# Patient Record
Sex: MALE | Race: WHITE | Employment: OTHER | URBAN - METROPOLITAN AREA
[De-identification: names, ages, dates, MRNs, and addresses within clinical notes are randomized per-mention and may not be internally consistent; named-entity substitution may affect disease eponyms.]

---

## 2017-07-21 PROBLEM — E03.9 ACQUIRED HYPOTHYROIDISM: Status: ACTIVE | Noted: 2017-07-21

## 2017-07-21 PROBLEM — C61 PROSTATE CANCER (HCC): Status: ACTIVE | Noted: 2017-07-21

## 2018-04-19 RX ORDER — LISINOPRIL 40 MG/1
40 TABLET ORAL EVERY EVENING
Qty: 90 TABLET | Refills: 1 | Status: SHIPPED | OUTPATIENT
Start: 2018-04-19 | End: 2018-10-29 | Stop reason: SDUPTHER

## 2018-04-19 RX ORDER — AMLODIPINE BESYLATE 5 MG/1
5 TABLET ORAL DAILY
Qty: 90 TABLET | Refills: 1 | Status: SHIPPED | OUTPATIENT
Start: 2018-04-19 | End: 2018-10-29 | Stop reason: SDUPTHER

## 2018-06-19 ENCOUNTER — HOSPITAL ENCOUNTER (OUTPATIENT)
Age: 83
Discharge: HOME | End: 2018-06-21
Payer: MEDICARE

## 2018-06-19 ENCOUNTER — OFFICE VISIT (OUTPATIENT)
Dept: PRIMARY CARE CLINIC | Age: 83
End: 2018-06-19
Payer: MEDICARE

## 2018-06-19 VITALS
HEART RATE: 75 BPM | BODY MASS INDEX: 23.26 KG/M2 | OXYGEN SATURATION: 98 % | TEMPERATURE: 98.2 F | SYSTOLIC BLOOD PRESSURE: 138 MMHG | WEIGHT: 153 LBS | DIASTOLIC BLOOD PRESSURE: 74 MMHG

## 2018-06-19 DIAGNOSIS — E11.65 UNCONTROLLED TYPE 2 DIABETES MELLITUS WITH HYPERGLYCEMIA, UNSPECIFIED LONG TERM INSULIN USE STATUS: Chronic | ICD-10-CM

## 2018-06-19 DIAGNOSIS — E11.65 UNCONTROLLED TYPE 2 DIABETES MELLITUS WITH HYPERGLYCEMIA, UNSPECIFIED LONG TERM INSULIN USE STATUS: Primary | Chronic | ICD-10-CM

## 2018-06-19 DIAGNOSIS — I10 ESSENTIAL HYPERTENSION: ICD-10-CM

## 2018-06-19 LAB
ALBUMIN SERPL-MCNC: 4.3 G/DL (ref 3.5–5.2)
ALP BLD-CCNC: 67 U/L (ref 40–129)
ALT SERPL-CCNC: 10 U/L (ref 0–40)
ANION GAP SERPL CALCULATED.3IONS-SCNC: 19 MMOL/L (ref 7–16)
AST SERPL-CCNC: 15 U/L (ref 0–39)
BILIRUB SERPL-MCNC: 0.5 MG/DL (ref 0–1.2)
BUN BLDV-MCNC: 29 MG/DL (ref 8–23)
CALCIUM SERPL-MCNC: 9.7 MG/DL (ref 8.6–10.2)
CHLORIDE BLD-SCNC: 98 MMOL/L (ref 98–107)
CHOLESTEROL, TOTAL: 185 MG/DL (ref 0–199)
CO2: 21 MMOL/L (ref 22–29)
CREAT SERPL-MCNC: 1.4 MG/DL (ref 0.7–1.2)
CREATININE URINE: 0 MG/DL (ref 40–278)
GFR AFRICAN AMERICAN: 57
GFR NON-AFRICAN AMERICAN: 48 ML/MIN/1.73
GLUCOSE BLD-MCNC: 314 MG/DL (ref 74–109)
HBA1C MFR BLD: 11 % (ref 4.8–5.9)
HDLC SERPL-MCNC: 44 MG/DL
LDL CHOLESTEROL CALCULATED: 109 MG/DL (ref 0–99)
MICROALBUMIN UR-MCNC: <12 MG/L
MICROALBUMIN/CREAT UR-RTO: ABNORMAL (ref 0–30)
POTASSIUM SERPL-SCNC: 4.7 MMOL/L (ref 3.5–5)
SODIUM BLD-SCNC: 138 MMOL/L (ref 132–146)
TOTAL PROTEIN: 7.5 G/DL (ref 6.4–8.3)
TRIGL SERPL-MCNC: 161 MG/DL (ref 0–149)
VLDLC SERPL CALC-MCNC: 32 MG/DL

## 2018-06-19 PROCEDURE — 83036 HEMOGLOBIN GLYCOSYLATED A1C: CPT

## 2018-06-19 PROCEDURE — 80061 LIPID PANEL: CPT

## 2018-06-19 PROCEDURE — 99213 OFFICE O/P EST LOW 20 MIN: CPT | Performed by: FAMILY MEDICINE

## 2018-06-19 PROCEDURE — 82570 ASSAY OF URINE CREATININE: CPT

## 2018-06-19 PROCEDURE — 80053 COMPREHEN METABOLIC PANEL: CPT

## 2018-06-19 PROCEDURE — 82044 UR ALBUMIN SEMIQUANTITATIVE: CPT

## 2018-06-19 ASSESSMENT — ENCOUNTER SYMPTOMS
SHORTNESS OF BREATH: 0
BLURRED VISION: 0
BLOOD IN STOOL: 0
COUGH: 0
VISUAL CHANGE: 0
CONSTIPATION: 0
DIARRHEA: 0

## 2018-06-19 ASSESSMENT — PATIENT HEALTH QUESTIONNAIRE - PHQ9
2. FEELING DOWN, DEPRESSED OR HOPELESS: 0
SUM OF ALL RESPONSES TO PHQ9 QUESTIONS 1 & 2: 0
1. LITTLE INTEREST OR PLEASURE IN DOING THINGS: 0
SUM OF ALL RESPONSES TO PHQ QUESTIONS 1-9: 0

## 2018-06-21 ENCOUNTER — TELEPHONE (OUTPATIENT)
Dept: PRIMARY CARE CLINIC | Age: 83
End: 2018-06-21

## 2018-07-19 DIAGNOSIS — E03.9 ACQUIRED HYPOTHYROIDISM: ICD-10-CM

## 2018-07-19 RX ORDER — LEVOTHYROXINE SODIUM 0.05 MG/1
50 TABLET ORAL DAILY
Qty: 90 TABLET | Refills: 1 | Status: SHIPPED | OUTPATIENT
Start: 2018-07-19 | End: 2018-10-19 | Stop reason: DRUGHIGH

## 2018-09-25 ENCOUNTER — HOSPITAL ENCOUNTER (EMERGENCY)
Age: 83
Discharge: HOME | End: 2018-09-25
Attending: EMERGENCY MEDICINE
Payer: MEDICARE

## 2018-09-25 ENCOUNTER — NURSE ONLY (OUTPATIENT)
Dept: PRIMARY CARE CLINIC | Age: 83
End: 2018-09-25
Payer: MEDICARE

## 2018-09-25 VITALS
SYSTOLIC BLOOD PRESSURE: 154 MMHG | BODY MASS INDEX: 24.91 KG/M2 | OXYGEN SATURATION: 98 % | TEMPERATURE: 98.2 F | HEART RATE: 80 BPM | RESPIRATION RATE: 16 BRPM | WEIGHT: 155 LBS | DIASTOLIC BLOOD PRESSURE: 80 MMHG | HEIGHT: 66 IN

## 2018-09-25 DIAGNOSIS — Z98.890 HX OF REMOVAL OF CYST: Primary | ICD-10-CM

## 2018-09-25 DIAGNOSIS — Z23 NEED FOR INFLUENZA VACCINATION: Primary | ICD-10-CM

## 2018-09-25 PROCEDURE — 99282 EMERGENCY DEPT VISIT SF MDM: CPT

## 2018-09-25 PROCEDURE — 90662 IIV NO PRSV INCREASED AG IM: CPT | Performed by: FAMILY MEDICINE

## 2018-09-25 PROCEDURE — G0008 ADMIN INFLUENZA VIRUS VAC: HCPCS | Performed by: FAMILY MEDICINE

## 2018-09-25 RX ORDER — LIDOCAINE HYDROCHLORIDE AND EPINEPHRINE 10; 10 MG/ML; UG/ML
INJECTION, SOLUTION INFILTRATION; PERINEURAL
Status: DISCONTINUED
Start: 2018-09-25 | End: 2018-09-25 | Stop reason: HOSPADM

## 2018-09-25 ASSESSMENT — PAIN SCALES - GENERAL: PAINLEVEL_OUTOF10: 0

## 2018-09-25 NOTE — ED NOTES
Discharge instructions reviewed with pt including diagnosis, follow up appointments, and S/S of when to return. Pt verbalized understanding. Dry, sterile dressing applied.        Cristian Islas RN  09/25/18 2633

## 2018-09-25 NOTE — PROGRESS NOTES
Vaccine Information Sheet, \"Influenza - Inactivated\"  given to Hank Arrington, or parent/legal guardian of  Hank Arrington and verbalized understanding. Patient responses:    Have you ever had a reaction to a flu vaccine? No  Are you able to eat eggs without adverse effects? Yes  Do you have any current illness? No  Have you ever had Guillian Wilmont Syndrome? No    Flu vaccine given per order. Please see immunization tab.

## 2018-10-16 ENCOUNTER — HOSPITAL ENCOUNTER (OUTPATIENT)
Age: 83
Discharge: HOME | End: 2018-10-18
Payer: MEDICARE

## 2018-10-16 ENCOUNTER — OFFICE VISIT (OUTPATIENT)
Dept: PRIMARY CARE CLINIC | Age: 83
End: 2018-10-16
Payer: MEDICARE

## 2018-10-16 VITALS
TEMPERATURE: 96.3 F | HEART RATE: 69 BPM | DIASTOLIC BLOOD PRESSURE: 70 MMHG | OXYGEN SATURATION: 98 % | BODY MASS INDEX: 24.53 KG/M2 | SYSTOLIC BLOOD PRESSURE: 136 MMHG | WEIGHT: 152 LBS

## 2018-10-16 DIAGNOSIS — E11.65 UNCONTROLLED TYPE 2 DIABETES MELLITUS WITH HYPERGLYCEMIA (HCC): Primary | Chronic | ICD-10-CM

## 2018-10-16 DIAGNOSIS — E03.9 ACQUIRED HYPOTHYROIDISM: ICD-10-CM

## 2018-10-16 DIAGNOSIS — E11.65 UNCONTROLLED TYPE 2 DIABETES MELLITUS WITH HYPERGLYCEMIA (HCC): Chronic | ICD-10-CM

## 2018-10-16 DIAGNOSIS — Z48.02 VISIT FOR SUTURE REMOVAL: ICD-10-CM

## 2018-10-16 LAB
HBA1C MFR BLD: 10.5 % (ref 4–5.6)
TSH SERPL DL<=0.05 MIU/L-ACNC: 6.16 UIU/ML (ref 0.27–4.2)

## 2018-10-16 PROCEDURE — 99213 OFFICE O/P EST LOW 20 MIN: CPT | Performed by: FAMILY MEDICINE

## 2018-10-16 PROCEDURE — 84443 ASSAY THYROID STIM HORMONE: CPT

## 2018-10-16 PROCEDURE — 83036 HEMOGLOBIN GLYCOSYLATED A1C: CPT

## 2018-10-16 ASSESSMENT — ENCOUNTER SYMPTOMS
DIARRHEA: 0
SHORTNESS OF BREATH: 0
BLURRED VISION: 0
ABDOMINAL PAIN: 0
CONSTIPATION: 0
NAUSEA: 0
VISUAL CHANGE: 0

## 2018-10-16 NOTE — PROGRESS NOTES
tenderness. Abdominal: Soft. Normal appearance and bowel sounds are normal. He exhibits no distension and no mass. There is no tenderness. There is no rebound and no guarding. Musculoskeletal: Normal range of motion. He exhibits no edema. Lymphadenopathy:     He has no cervical adenopathy. Neurological: He is alert and oriented to person, place, and time. Skin: Skin is warm and dry. Psychiatric: He has a normal mood and affect. Vitals reviewed. ASSESSMENT AND PLAN:    Laura Humphries was seen today for diabetes and suture / staple removal.    Diagnoses and all orders for this visit:    Uncontrolled type 2 diabetes mellitus with hyperglycemia ([facility] [address]  -     Hemoglobin A1C; Future    Visit for suture removal    Acquired hypothyroidism  -     TSH without Reflex; Future    PROCEDURE NOTE:  Suture removal   The procedure and its risks, benefits and alternatives were discussed with the patient, and informed consent was obtained. #6 sutures were removed in their entirety using a suture kit. There was no blood loss. The patient tolerated the procedure well. Discussed signs and symptoms of infection and advised to call right away if this happens. Patient verbalizes understanding and agrees with above assessment, plan, procedure and counseling. All questions answered. - increase Toujeo to 20 units nightly. - bp stable   - continue all other current meds  Return in about 4 months (around 2/16/2019) for or for acute problem. I reviewed the students documentation ( Hx, exam, MDM ), examined the patient and performed the A&P.     Anuj Mullen DO

## 2018-10-19 ENCOUNTER — TELEPHONE (OUTPATIENT)
Dept: PRIMARY CARE CLINIC | Age: 83
End: 2018-10-19

## 2018-10-19 DIAGNOSIS — E03.9 ACQUIRED HYPOTHYROIDISM: ICD-10-CM

## 2018-10-19 RX ORDER — LEVOTHYROXINE SODIUM 0.07 MG/1
75 TABLET ORAL DAILY
Qty: 90 TABLET | Refills: 0
Start: 2018-10-19 | End: 2018-10-19 | Stop reason: DRUGHIGH

## 2018-10-19 RX ORDER — LEVOTHYROXINE SODIUM 0.07 MG/1
75 TABLET ORAL DAILY
Qty: 90 TABLET | Refills: 0 | Status: SHIPPED | OUTPATIENT
Start: 2018-10-19 | End: 2019-01-18 | Stop reason: SDUPTHER

## 2018-10-19 RX ORDER — LEVOTHYROXINE SODIUM 0.07 MG/1
50 TABLET ORAL DAILY
Qty: 90 TABLET | Refills: 0 | Status: SHIPPED | OUTPATIENT
Start: 2018-10-19 | End: 2018-10-19 | Stop reason: DRUGHIGH

## 2018-10-19 NOTE — TELEPHONE ENCOUNTER
Called patient left message with wife to have all increases insulin to 25 units rather than 20 units due to his hemoglobin A1c still being quite elevated. Also we will increase his Synthroid from 50 µg to 75 µg due to an elevated TSH. Prescription to be called in.

## 2018-10-29 RX ORDER — AMLODIPINE BESYLATE 5 MG/1
5 TABLET ORAL DAILY
Qty: 90 TABLET | Refills: 1 | Status: SHIPPED | OUTPATIENT
Start: 2018-10-29 | End: 2019-04-29 | Stop reason: SDUPTHER

## 2018-10-29 RX ORDER — LISINOPRIL 40 MG/1
40 TABLET ORAL EVERY EVENING
Qty: 90 TABLET | Refills: 1 | Status: SHIPPED | OUTPATIENT
Start: 2018-10-29 | End: 2019-05-13 | Stop reason: SDUPTHER

## 2019-01-18 RX ORDER — LEVOTHYROXINE SODIUM 0.07 MG/1
75 TABLET ORAL DAILY
Qty: 90 TABLET | Refills: 1 | Status: SHIPPED | OUTPATIENT
Start: 2019-01-18 | End: 2019-07-15 | Stop reason: SDUPTHER

## 2019-02-15 ENCOUNTER — OFFICE VISIT (OUTPATIENT)
Dept: PRIMARY CARE CLINIC | Age: 84
End: 2019-02-15
Payer: MEDICARE

## 2019-02-15 ENCOUNTER — HOSPITAL ENCOUNTER (OUTPATIENT)
Age: 84
Discharge: HOME | End: 2019-02-17
Payer: MEDICARE

## 2019-02-15 VITALS
OXYGEN SATURATION: 95 % | DIASTOLIC BLOOD PRESSURE: 64 MMHG | TEMPERATURE: 96.4 F | BODY MASS INDEX: 24.53 KG/M2 | WEIGHT: 152 LBS | SYSTOLIC BLOOD PRESSURE: 160 MMHG | HEART RATE: 52 BPM

## 2019-02-15 DIAGNOSIS — C61 PROSTATE CANCER (HCC): ICD-10-CM

## 2019-02-15 DIAGNOSIS — E11.65 UNCONTROLLED TYPE 2 DIABETES MELLITUS WITH HYPERGLYCEMIA (HCC): Primary | Chronic | ICD-10-CM

## 2019-02-15 DIAGNOSIS — E03.9 ACQUIRED HYPOTHYROIDISM: ICD-10-CM

## 2019-02-15 DIAGNOSIS — Z12.5 ENCOUNTER FOR SCREENING FOR MALIGNANT NEOPLASM OF PROSTATE: ICD-10-CM

## 2019-02-15 DIAGNOSIS — E11.65 UNCONTROLLED TYPE 2 DIABETES MELLITUS WITH HYPERGLYCEMIA (HCC): Chronic | ICD-10-CM

## 2019-02-15 DIAGNOSIS — I10 ESSENTIAL HYPERTENSION: ICD-10-CM

## 2019-02-15 LAB
ALBUMIN SERPL-MCNC: 4.3 G/DL (ref 3.5–5.2)
ALP BLD-CCNC: 56 U/L (ref 40–129)
ALT SERPL-CCNC: 10 U/L (ref 0–40)
ANION GAP SERPL CALCULATED.3IONS-SCNC: 10 MMOL/L (ref 7–16)
AST SERPL-CCNC: 16 U/L (ref 0–39)
BILIRUB SERPL-MCNC: 0.4 MG/DL (ref 0–1.2)
BUN BLDV-MCNC: 31 MG/DL (ref 8–23)
CALCIUM SERPL-MCNC: 9 MG/DL (ref 8.6–10.2)
CHLORIDE BLD-SCNC: 103 MMOL/L (ref 98–107)
CHOLESTEROL, TOTAL: 173 MG/DL (ref 0–199)
CO2: 27 MMOL/L (ref 22–29)
CREAT SERPL-MCNC: 1.6 MG/DL (ref 0.7–1.2)
GFR AFRICAN AMERICAN: 49
GFR NON-AFRICAN AMERICAN: 41 ML/MIN/1.73
GLUCOSE BLD-MCNC: 94 MG/DL (ref 74–99)
HBA1C MFR BLD: 9.2 % (ref 4–5.6)
HDLC SERPL-MCNC: 47 MG/DL
LDL CHOLESTEROL CALCULATED: 99 MG/DL (ref 0–99)
POTASSIUM SERPL-SCNC: 4.9 MMOL/L (ref 3.5–5)
PROSTATE SPECIFIC ANTIGEN: 3.56 NG/ML (ref 0–4)
SODIUM BLD-SCNC: 140 MMOL/L (ref 132–146)
TOTAL PROTEIN: 7.2 G/DL (ref 6.4–8.3)
TRIGL SERPL-MCNC: 134 MG/DL (ref 0–149)
TSH SERPL DL<=0.05 MIU/L-ACNC: 2.38 UIU/ML (ref 0.27–4.2)
VLDLC SERPL CALC-MCNC: 27 MG/DL

## 2019-02-15 PROCEDURE — 83036 HEMOGLOBIN GLYCOSYLATED A1C: CPT

## 2019-02-15 PROCEDURE — 84443 ASSAY THYROID STIM HORMONE: CPT

## 2019-02-15 PROCEDURE — 99214 OFFICE O/P EST MOD 30 MIN: CPT | Performed by: FAMILY MEDICINE

## 2019-02-15 PROCEDURE — 80061 LIPID PANEL: CPT

## 2019-02-15 PROCEDURE — 80053 COMPREHEN METABOLIC PANEL: CPT

## 2019-02-15 PROCEDURE — G0103 PSA SCREENING: HCPCS

## 2019-02-15 ASSESSMENT — PATIENT HEALTH QUESTIONNAIRE - PHQ9
1. LITTLE INTEREST OR PLEASURE IN DOING THINGS: 0
SUM OF ALL RESPONSES TO PHQ QUESTIONS 1-9: 0
SUM OF ALL RESPONSES TO PHQ QUESTIONS 1-9: 0
SUM OF ALL RESPONSES TO PHQ9 QUESTIONS 1 & 2: 0
2. FEELING DOWN, DEPRESSED OR HOPELESS: 0

## 2019-02-15 ASSESSMENT — ENCOUNTER SYMPTOMS
BLURRED VISION: 0
SHORTNESS OF BREATH: 0

## 2019-02-18 ENCOUNTER — TELEPHONE (OUTPATIENT)
Dept: PRIMARY CARE CLINIC | Age: 84
End: 2019-02-18

## 2019-04-29 RX ORDER — AMLODIPINE BESYLATE 5 MG/1
5 TABLET ORAL DAILY
Qty: 30 TABLET | Refills: 0 | Status: SHIPPED | OUTPATIENT
Start: 2019-04-29 | End: 2019-05-29 | Stop reason: SDUPTHER

## 2019-05-07 ENCOUNTER — OFFICE VISIT (OUTPATIENT)
Dept: PRIMARY CARE CLINIC | Age: 84
End: 2019-05-07
Payer: MEDICARE

## 2019-05-07 VITALS
WEIGHT: 150 LBS | BODY MASS INDEX: 24.21 KG/M2 | TEMPERATURE: 97.7 F | SYSTOLIC BLOOD PRESSURE: 138 MMHG | DIASTOLIC BLOOD PRESSURE: 70 MMHG | OXYGEN SATURATION: 95 % | HEART RATE: 72 BPM

## 2019-05-07 DIAGNOSIS — J40 BRONCHITIS: Primary | ICD-10-CM

## 2019-05-07 PROCEDURE — 99213 OFFICE O/P EST LOW 20 MIN: CPT | Performed by: FAMILY MEDICINE

## 2019-05-07 RX ORDER — CEFDINIR 300 MG/1
300 CAPSULE ORAL 2 TIMES DAILY
Qty: 20 CAPSULE | Refills: 0 | Status: SHIPPED | OUTPATIENT
Start: 2019-05-07 | End: 2019-05-16 | Stop reason: ALTCHOICE

## 2019-05-07 ASSESSMENT — ENCOUNTER SYMPTOMS
RHINORRHEA: 1
SINUS PAIN: 0
COUGH: 1
WHEEZING: 1
SORE THROAT: 0
SINUS PRESSURE: 0
SHORTNESS OF BREATH: 1

## 2019-05-07 NOTE — PROGRESS NOTES
Anita Cisse, a male of 80 y.o. came to the office 5/7/2019. Patient Active Problem List   Diagnosis    Uncontrolled type 2 diabetes mellitus with complication, without long-term current use of insulin ([address]    Arteriosclerotic heart disease    Essential hypertension    Retropharyngeal abscess    Epiglottitis    Uncontrolled type 2 diabetes mellitus with hyperglycemia ([address]    Acquired hypothyroidism    Prostate cancer ([address]          Cough   This is a new problem. The current episode started in the past 7 days (7). The problem has been unchanged. The cough is productive of sputum (brownish). Associated symptoms include rhinorrhea, shortness of breath and wheezing. Pertinent negatives include no chills, ear pain, fever, headaches, nasal congestion, postnasal drip or sore throat. He has tried nothing for the symptoms. No Known Allergies    Current Outpatient Medications on File Prior to Visit   Medication Sig Dispense Refill    amLODIPine (NORVASC) 5 MG tablet Take 1 tablet by mouth daily 30 tablet 0    levothyroxine (SYNTHROID) 75 MCG tablet Take 1 tablet by mouth Daily 90 tablet 1    lisinopril (PRINIVIL;ZESTRIL) 40 MG tablet Take 1 tablet by mouth every evening 90 tablet 1    metFORMIN (GLUCOPHAGE) 1000 MG tablet Take 1 tablet by mouth 2 times daily (with meals) 180 tablet 1    atorvastatin (LIPITOR) 20 MG tablet TAKE ONE TABLET BY MOUTH EVERY DAY 30 tablet 4    insulin glargine (TOUJEO SOLOSTAR) 300 UNIT/ML injection pen 15 units injection into skin nightly (Patient taking differently: 18 Units 15 units injection into skin nightly) 3 Pen 3    glucose blood VI test strips (FREESTYLE LITE) strip 1 each by In Vitro route daily As needed.  100 each 3    aspirin 81 MG tablet Take 81 mg by mouth daily      Cholecalciferol (VITAMIN D3) 2000 UNITS CAPS Take by mouth daily      vitamin B-12 (CYANOCOBALAMIN) 1000 MCG tablet Take 1,000 mcg by mouth daily       No current facility-administered medications on file prior to visit. Review of Systems   Constitutional: Negative for chills and fever. HENT: Positive for rhinorrhea. Negative for ear pain, postnasal drip, sinus pressure, sinus pain and sore throat. Respiratory: Positive for cough, shortness of breath and wheezing. Neurological: Negative for headaches. All other review of systems reviewed and are negative    OBJECTIVE:  /70   Pulse 72   Temp 97.7 °F (36.5 °C)   Wt 150 lb (68 kg)   SpO2 95% Comment: oxygen did drop to 89 and 88 did come back up to 90  BMI 24.21 kg/m²      Physical Exam   Constitutional: No distress. HENT:   Right Ear: Tympanic membrane normal.   Left Ear: Tympanic membrane normal.   Nose: No mucosal edema or rhinorrhea. Right sinus exhibits no maxillary sinus tenderness and no frontal sinus tenderness. Left sinus exhibits no maxillary sinus tenderness and no frontal sinus tenderness. Mouth/Throat: Uvula is midline, oropharynx is clear and moist and mucous membranes are normal. No oropharyngeal exudate or posterior oropharyngeal erythema. Neck: Neck supple. Cardiovascular: Normal rate and regular rhythm. Pulmonary/Chest: Effort normal and breath sounds normal.   In office he coughed up tanish clear mucus. Lymphadenopathy:     He has no cervical adenopathy. ASSESSMENT AND PLAN:    Ana Paula Goldberg was seen today for congestion. Diagnoses and all orders for this visit:    Bronchitis  -     cefdinir (OMNICEF) 300 MG capsule; Take 1 capsule by mouth 2 times daily for 10 days    - push fluids. Return if symptoms worsen or fail to improve.     Javi Davising Paz, DO

## 2019-05-13 RX ORDER — LISINOPRIL 40 MG/1
40 TABLET ORAL EVERY EVENING
Qty: 90 TABLET | Refills: 1 | Status: SHIPPED | OUTPATIENT
Start: 2019-05-13

## 2019-05-16 ENCOUNTER — OFFICE VISIT (OUTPATIENT)
Dept: PRIMARY CARE CLINIC | Age: 84
End: 2019-05-16
Payer: MEDICARE

## 2019-05-16 VITALS
HEART RATE: 74 BPM | HEIGHT: 67 IN | SYSTOLIC BLOOD PRESSURE: 126 MMHG | TEMPERATURE: 98.2 F | BODY MASS INDEX: 23.54 KG/M2 | OXYGEN SATURATION: 98 % | WEIGHT: 150 LBS | DIASTOLIC BLOOD PRESSURE: 84 MMHG

## 2019-05-16 DIAGNOSIS — I10 ESSENTIAL HYPERTENSION: ICD-10-CM

## 2019-05-16 DIAGNOSIS — E11.65 UNCONTROLLED TYPE 2 DIABETES MELLITUS WITH HYPERGLYCEMIA (HCC): Primary | ICD-10-CM

## 2019-05-16 LAB — HBA1C MFR BLD: 8.4 %

## 2019-05-16 PROCEDURE — 83036 HEMOGLOBIN GLYCOSYLATED A1C: CPT | Performed by: FAMILY MEDICINE

## 2019-05-16 PROCEDURE — 99213 OFFICE O/P EST LOW 20 MIN: CPT | Performed by: FAMILY MEDICINE

## 2019-05-16 ASSESSMENT — ENCOUNTER SYMPTOMS
SHORTNESS OF BREATH: 0
VISUAL CHANGE: 0
BLURRED VISION: 0

## 2019-05-16 NOTE — PROGRESS NOTES
30 tablet 4    insulin glargine (TOUJEO SOLOSTAR) 300 UNIT/ML injection pen 15 units injection into skin nightly (Patient taking differently: 18 Units 15 units injection into skin nightly) 3 Pen 3    glucose blood VI test strips (FREESTYLE LITE) strip 1 each by In Vitro route daily As needed. 100 each 3    aspirin 81 MG tablet Take 81 mg by mouth daily      Cholecalciferol (VITAMIN D3) 2000 UNITS CAPS Take by mouth daily      vitamin B-12 (CYANOCOBALAMIN) 1000 MCG tablet Take 1,000 mcg by mouth daily       No current facility-administered medications on file prior to visit. Review of Systems   Constitutional: Negative for fatigue and weight loss. Eyes: Negative for blurred vision. Respiratory: Negative for shortness of breath. Cardiovascular: Negative for chest pain and palpitations. Endocrine: Negative for polydipsia and polyuria. Musculoskeletal: Negative for myalgias. All other review of systems reviewed and are negative    OBJECTIVE:  /84   Pulse 74   Temp 98.2 °F (36.8 °C)   Ht 5' 7\" (1.702 m)   Wt 150 lb (68 kg)   SpO2 98%   BMI 23.49 kg/m²      Physical Exam   Constitutional: He is oriented to person, place, and time. He appears well-nourished. Eyes: Conjunctivae are normal. No scleral icterus. Neck: Neck supple. Carotid bruit is not present. No thyromegaly present. Cardiovascular: Normal rate and regular rhythm. No murmur heard. Pulmonary/Chest: Effort normal and breath sounds normal. He has no wheezes. He has no rales. Abdominal: Soft. Bowel sounds are normal. He exhibits no mass. There is no tenderness. There is no rebound and no guarding. Musculoskeletal: Normal range of motion. He exhibits no edema. Lymphadenopathy:     He has no cervical adenopathy. Neurological: He is alert and oriented to person, place, and time. Skin: Skin is warm and dry. Psychiatric: He has a normal mood and affect. Vitals reviewed.   Results for Gio Fitzpatrick (MRN

## 2019-05-29 RX ORDER — AMLODIPINE BESYLATE 5 MG/1
5 TABLET ORAL DAILY
Qty: 90 TABLET | Refills: 1 | Status: SHIPPED | OUTPATIENT
Start: 2019-05-29

## 2019-07-15 RX ORDER — LEVOTHYROXINE SODIUM 0.07 MG/1
75 TABLET ORAL DAILY
Qty: 90 TABLET | Refills: 1 | Status: SHIPPED | OUTPATIENT
Start: 2019-07-15

## 2019-08-16 ENCOUNTER — HOSPITAL ENCOUNTER (OUTPATIENT)
Age: 84
Discharge: HOME | End: 2019-08-18
Payer: MEDICARE

## 2019-08-16 ENCOUNTER — OFFICE VISIT (OUTPATIENT)
Dept: PRIMARY CARE CLINIC | Age: 84
End: 2019-08-16
Payer: MEDICARE

## 2019-08-16 VITALS
TEMPERATURE: 98 F | HEIGHT: 67 IN | OXYGEN SATURATION: 95 % | SYSTOLIC BLOOD PRESSURE: 132 MMHG | DIASTOLIC BLOOD PRESSURE: 62 MMHG | HEART RATE: 83 BPM | WEIGHT: 152 LBS | BODY MASS INDEX: 23.86 KG/M2

## 2019-08-16 DIAGNOSIS — Z00.00 ROUTINE GENERAL MEDICAL EXAMINATION AT A HEALTH CARE FACILITY: ICD-10-CM

## 2019-08-16 DIAGNOSIS — I10 ESSENTIAL HYPERTENSION: ICD-10-CM

## 2019-08-16 DIAGNOSIS — L03.115 CELLULITIS OF RIGHT LOWER LEG: ICD-10-CM

## 2019-08-16 DIAGNOSIS — H61.23 BILATERAL IMPACTED CERUMEN: ICD-10-CM

## 2019-08-16 DIAGNOSIS — E11.65 UNCONTROLLED TYPE 2 DIABETES MELLITUS WITH HYPERGLYCEMIA (HCC): Primary | ICD-10-CM

## 2019-08-16 DIAGNOSIS — E11.65 UNCONTROLLED TYPE 2 DIABETES MELLITUS WITH HYPERGLYCEMIA (HCC): ICD-10-CM

## 2019-08-16 LAB
ALBUMIN SERPL-MCNC: 3.8 G/DL (ref 3.5–5.2)
ALP BLD-CCNC: 64 U/L (ref 40–129)
ALT SERPL-CCNC: 11 U/L (ref 0–40)
ANION GAP SERPL CALCULATED.3IONS-SCNC: 15 MMOL/L (ref 7–16)
AST SERPL-CCNC: 16 U/L (ref 0–39)
BILIRUB SERPL-MCNC: 0.6 MG/DL (ref 0–1.2)
BUN BLDV-MCNC: 19 MG/DL (ref 8–23)
CALCIUM SERPL-MCNC: 9 MG/DL (ref 8.6–10.2)
CHLORIDE BLD-SCNC: 98 MMOL/L (ref 98–107)
CHOLESTEROL, TOTAL: 178 MG/DL (ref 0–199)
CO2: 23 MMOL/L (ref 22–29)
CREAT SERPL-MCNC: 1.3 MG/DL (ref 0.7–1.2)
GFR AFRICAN AMERICAN: >60
GFR NON-AFRICAN AMERICAN: 52 ML/MIN/1.73
GLUCOSE BLD-MCNC: 227 MG/DL (ref 74–99)
HBA1C MFR BLD: 9.3 % (ref 4–5.6)
HDLC SERPL-MCNC: 46 MG/DL
LDL CHOLESTEROL CALCULATED: 111 MG/DL (ref 0–99)
POTASSIUM SERPL-SCNC: 4.8 MMOL/L (ref 3.5–5)
SODIUM BLD-SCNC: 136 MMOL/L (ref 132–146)
TOTAL PROTEIN: 6.7 G/DL (ref 6.4–8.3)
TRIGL SERPL-MCNC: 104 MG/DL (ref 0–149)
VLDLC SERPL CALC-MCNC: 21 MG/DL

## 2019-08-16 PROCEDURE — 83036 HEMOGLOBIN GLYCOSYLATED A1C: CPT

## 2019-08-16 PROCEDURE — 80061 LIPID PANEL: CPT

## 2019-08-16 PROCEDURE — 99213 OFFICE O/P EST LOW 20 MIN: CPT | Performed by: FAMILY MEDICINE

## 2019-08-16 PROCEDURE — 80053 COMPREHEN METABOLIC PANEL: CPT

## 2019-08-16 PROCEDURE — G0438 PPPS, INITIAL VISIT: HCPCS | Performed by: FAMILY MEDICINE

## 2019-08-16 RX ORDER — SULFAMETHOXAZOLE AND TRIMETHOPRIM 800; 160 MG/1; MG/1
1 TABLET ORAL 2 TIMES DAILY
Qty: 20 TABLET | Refills: 0 | Status: SHIPPED | OUTPATIENT
Start: 2019-08-16 | End: 2019-08-26

## 2019-08-16 ASSESSMENT — ENCOUNTER SYMPTOMS
SHORTNESS OF BREATH: 0
COLOR CHANGE: 1
SORE THROAT: 0
NAUSEA: 0
RHINORRHEA: 0
BLURRED VISION: 0
COUGH: 0
DIARRHEA: 0
VOMITING: 0
ABDOMINAL PAIN: 0
VISUAL CHANGE: 0
CONSTIPATION: 0

## 2019-08-16 ASSESSMENT — PATIENT HEALTH QUESTIONNAIRE - PHQ9
SUM OF ALL RESPONSES TO PHQ QUESTIONS 1-9: 0
SUM OF ALL RESPONSES TO PHQ QUESTIONS 1-9: 0

## 2019-08-16 ASSESSMENT — LIFESTYLE VARIABLES: HOW OFTEN DO YOU HAVE A DRINK CONTAINING ALCOHOL: 0

## 2019-08-16 NOTE — PROGRESS NOTES
Korey Sharma, a male of 80 y.o. came to the office 8/16/2019. Patient Active Problem List   Diagnosis    Uncontrolled type 2 diabetes mellitus with complication, without long-term current use of insulin ([facility] [address]    Arteriosclerotic heart disease    Essential hypertension    Retropharyngeal abscess    Epiglottitis    Uncontrolled type 2 diabetes mellitus with hyperglycemia ([facility] [address]    Acquired hypothyroidism    Prostate cancer ([facility] [address]          Diabetes   He presents for his follow-up diabetic visit. He has type 2 diabetes mellitus. Pertinent negatives for hypoglycemia include no dizziness or headaches. Pertinent negatives for diabetes include no blurred vision, no chest pain, no fatigue, no foot paresthesias, no foot ulcerations, no visual change and no weakness. Current diabetic treatment includes insulin injections. He is following a generally healthy diet. His overall blood glucose range is 110-130 mg/dl. An ACE inhibitor/angiotensin II receptor blocker is being taken. Hyperlipidemia   This is a chronic problem. The problem is controlled. Pertinent negatives include no chest pain, focal sensory loss, leg pain, myalgias or shortness of breath. Current antihyperlipidemic treatment includes statins. There are no compliance problems. Hypertension   This is a chronic problem. The current episode started more than 1 year ago. The problem is controlled. Associated symptoms include peripheral edema (swollen right leg). Pertinent negatives include no blurred vision, chest pain, headaches, malaise/fatigue, palpitations or shortness of breath. Past treatments include ACE inhibitors and calcium channel blockers. There are no compliance problems. Rash: rle for 2 wks with reddness and seepage.      No Known Allergies    Current Outpatient Medications on File Prior to Visit   Medication Sig Dispense Refill    levothyroxine (SYNTHROID) 75 MCG tablet Take 1 tablet by mouth Daily 90 tablet 1    amLODIPine (NORVASC) 5

## 2019-08-19 ENCOUNTER — HOSPITAL ENCOUNTER (INPATIENT)
Age: 84
LOS: 11 days | Discharge: NURSING, REHAB OR LONG-TERM CARE | DRG: 064 | End: 2019-08-30
Attending: EMERGENCY MEDICINE | Admitting: INTERNAL MEDICINE
Payer: MEDICARE

## 2019-08-19 ENCOUNTER — APPOINTMENT (OUTPATIENT)
Dept: CT IMAGING | Age: 84
DRG: 064 | End: 2019-08-19
Payer: MEDICARE

## 2019-08-19 ENCOUNTER — APPOINTMENT (OUTPATIENT)
Dept: GENERAL RADIOLOGY | Age: 84
DRG: 064 | End: 2019-08-19
Payer: MEDICARE

## 2019-08-19 DIAGNOSIS — I21.4 NSTEMI (NON-ST ELEVATED MYOCARDIAL INFARCTION) (HCC): ICD-10-CM

## 2019-08-19 DIAGNOSIS — J96.01 ACUTE RESPIRATORY FAILURE WITH HYPOXIA (HCC): ICD-10-CM

## 2019-08-19 DIAGNOSIS — J69.0 ASPIRATION PNEUMONIA OF RIGHT LUNG, UNSPECIFIED ASPIRATION PNEUMONIA TYPE, UNSPECIFIED PART OF LUNG (HCC): ICD-10-CM

## 2019-08-19 DIAGNOSIS — I63.511 ACUTE ISCHEMIC RIGHT MCA STROKE (HCC): Primary | ICD-10-CM

## 2019-08-19 PROBLEM — E78.5 HYPERLIPIDEMIA: Chronic | Status: ACTIVE | Noted: 2019-08-19

## 2019-08-19 PROBLEM — E11.9 TYPE 2 DIABETES MELLITUS, WITH LONG-TERM CURRENT USE OF INSULIN (HCC): Chronic | Status: ACTIVE | Noted: 2019-08-19

## 2019-08-19 PROBLEM — I10 HYPERTENSION: Chronic | Status: ACTIVE | Noted: 2019-08-19

## 2019-08-19 PROBLEM — Z79.4 TYPE 2 DIABETES MELLITUS, WITH LONG-TERM CURRENT USE OF INSULIN (HCC): Chronic | Status: ACTIVE | Noted: 2019-08-19

## 2019-08-19 LAB
ALBUMIN SERPL-MCNC: 4.1 G/DL (ref 3.5–5.2)
ALP BLD-CCNC: 77 U/L (ref 40–129)
ALT SERPL-CCNC: 15 U/L (ref 0–40)
ANION GAP SERPL CALCULATED.3IONS-SCNC: 22 MMOL/L (ref 7–16)
APTT: 30.3 SEC (ref 24.5–35.1)
AST SERPL-CCNC: 23 U/L (ref 0–39)
BACTERIA: ABNORMAL /HPF
BASOPHILS ABSOLUTE: 0.03 E9/L (ref 0–0.2)
BASOPHILS RELATIVE PERCENT: 0.2 % (ref 0–2)
BILIRUB SERPL-MCNC: 0.6 MG/DL (ref 0–1.2)
BILIRUBIN URINE: NEGATIVE
BLOOD, URINE: ABNORMAL
BUN BLDV-MCNC: 34 MG/DL (ref 8–23)
CALCIUM SERPL-MCNC: 9.1 MG/DL (ref 8.6–10.2)
CHLORIDE BLD-SCNC: 96 MMOL/L (ref 98–107)
CLARITY: CLEAR
CO2: 18 MMOL/L (ref 22–29)
COLOR: YELLOW
CREAT SERPL-MCNC: 1.7 MG/DL (ref 0.7–1.2)
EKG ATRIAL RATE: 84 BPM
EKG Q-T INTERVAL: 418 MS
EKG QRS DURATION: 116 MS
EKG QTC CALCULATION (BAZETT): 488 MS
EKG R AXIS: 75 DEGREES
EKG T AXIS: -179 DEGREES
EKG VENTRICULAR RATE: 82 BPM
EOSINOPHILS ABSOLUTE: 0.02 E9/L (ref 0.05–0.5)
EOSINOPHILS RELATIVE PERCENT: 0.2 % (ref 0–6)
GFR AFRICAN AMERICAN: 46
GFR NON-AFRICAN AMERICAN: 38 ML/MIN/1.73
GLUCOSE BLD-MCNC: 435 MG/DL (ref 74–99)
GLUCOSE URINE: >=1000 MG/DL
HCT VFR BLD CALC: 41.6 % (ref 37–54)
HEMOGLOBIN: 13 G/DL (ref 12.5–16.5)
IMMATURE GRANULOCYTES #: 0.06 E9/L
IMMATURE GRANULOCYTES %: 0.5 % (ref 0–5)
INR BLD: 1.1
KETONES, URINE: 15 MG/DL
LACTIC ACID: 4.4 MMOL/L (ref 0.5–2.2)
LEUKOCYTE ESTERASE, URINE: NEGATIVE
LYMPHOCYTES ABSOLUTE: 0.96 E9/L (ref 1.5–4)
LYMPHOCYTES RELATIVE PERCENT: 7.4 % (ref 20–42)
MCH RBC QN AUTO: 28 PG (ref 26–35)
MCHC RBC AUTO-ENTMCNC: 31.3 % (ref 32–34.5)
MCV RBC AUTO: 89.5 FL (ref 80–99.9)
METER GLUCOSE: 320 MG/DL (ref 74–99)
METER GLUCOSE: 363 MG/DL (ref 74–99)
MONOCYTES ABSOLUTE: 0.34 E9/L (ref 0.1–0.95)
MONOCYTES RELATIVE PERCENT: 2.6 % (ref 2–12)
NEUTROPHILS ABSOLUTE: 11.6 E9/L (ref 1.8–7.3)
NEUTROPHILS RELATIVE PERCENT: 89.1 % (ref 43–80)
NITRITE, URINE: NEGATIVE
PDW BLD-RTO: 15.3 FL (ref 11.5–15)
PH UA: 5.5 (ref 5–9)
PLATELET # BLD: 262 E9/L (ref 130–450)
PMV BLD AUTO: 10.5 FL (ref 7–12)
POTASSIUM SERPL-SCNC: 4.5 MMOL/L (ref 3.5–5)
PROTEIN UA: 100 MG/DL
PROTHROMBIN TIME: 12.3 SEC (ref 9.3–12.4)
RBC # BLD: 4.65 E12/L (ref 3.8–5.8)
RBC UA: ABNORMAL /HPF (ref 0–2)
SODIUM BLD-SCNC: 136 MMOL/L (ref 132–146)
SPECIFIC GRAVITY UA: >=1.03 (ref 1–1.03)
TOTAL CK: 387 U/L (ref 20–200)
TOTAL PROTEIN: 7.4 G/DL (ref 6.4–8.3)
TROPONIN: 0.08 NG/ML (ref 0–0.03)
UROBILINOGEN, URINE: 0.2 E.U./DL
WBC # BLD: 13 E9/L (ref 4.5–11.5)
WBC UA: ABNORMAL /HPF (ref 0–5)

## 2019-08-19 PROCEDURE — 94664 DEMO&/EVAL PT USE INHALER: CPT

## 2019-08-19 PROCEDURE — 85610 PROTHROMBIN TIME: CPT

## 2019-08-19 PROCEDURE — 70450 CT HEAD/BRAIN W/O DYE: CPT

## 2019-08-19 PROCEDURE — 6370000000 HC RX 637 (ALT 250 FOR IP): Performed by: INTERNAL MEDICINE

## 2019-08-19 PROCEDURE — 99285 EMERGENCY DEPT VISIT HI MDM: CPT

## 2019-08-19 PROCEDURE — 6360000002 HC RX W HCPCS: Performed by: EMERGENCY MEDICINE

## 2019-08-19 PROCEDURE — 2580000003 HC RX 258: Performed by: INTERNAL MEDICINE

## 2019-08-19 PROCEDURE — 85730 THROMBOPLASTIN TIME PARTIAL: CPT

## 2019-08-19 PROCEDURE — 93005 ELECTROCARDIOGRAM TRACING: CPT | Performed by: EMERGENCY MEDICINE

## 2019-08-19 PROCEDURE — 2060000000 HC ICU INTERMEDIATE R&B

## 2019-08-19 PROCEDURE — 82962 GLUCOSE BLOOD TEST: CPT

## 2019-08-19 PROCEDURE — 82550 ASSAY OF CK (CPK): CPT

## 2019-08-19 PROCEDURE — 87040 BLOOD CULTURE FOR BACTERIA: CPT

## 2019-08-19 PROCEDURE — 6370000000 HC RX 637 (ALT 250 FOR IP): Performed by: EMERGENCY MEDICINE

## 2019-08-19 PROCEDURE — 85025 COMPLETE CBC W/AUTO DIFF WBC: CPT

## 2019-08-19 PROCEDURE — 84484 ASSAY OF TROPONIN QUANT: CPT

## 2019-08-19 PROCEDURE — 81001 URINALYSIS AUTO W/SCOPE: CPT

## 2019-08-19 PROCEDURE — 94640 AIRWAY INHALATION TREATMENT: CPT

## 2019-08-19 PROCEDURE — 96365 THER/PROPH/DIAG IV INF INIT: CPT

## 2019-08-19 PROCEDURE — 71045 X-RAY EXAM CHEST 1 VIEW: CPT

## 2019-08-19 PROCEDURE — 36415 COLL VENOUS BLD VENIPUNCTURE: CPT

## 2019-08-19 PROCEDURE — 83605 ASSAY OF LACTIC ACID: CPT

## 2019-08-19 PROCEDURE — 80053 COMPREHEN METABOLIC PANEL: CPT

## 2019-08-19 PROCEDURE — 6360000002 HC RX W HCPCS: Performed by: INTERNAL MEDICINE

## 2019-08-19 PROCEDURE — 2580000003 HC RX 258: Performed by: EMERGENCY MEDICINE

## 2019-08-19 PROCEDURE — 93010 ELECTROCARDIOGRAM REPORT: CPT | Performed by: INTERNAL MEDICINE

## 2019-08-19 RX ORDER — IPRATROPIUM BROMIDE AND ALBUTEROL SULFATE 2.5; .5 MG/3ML; MG/3ML
1 SOLUTION RESPIRATORY (INHALATION)
Status: DISCONTINUED | OUTPATIENT
Start: 2019-08-19 | End: 2019-08-20

## 2019-08-19 RX ORDER — AMLODIPINE BESYLATE 5 MG/1
5 TABLET ORAL DAILY
Status: ON HOLD | COMMUNITY
End: 2019-08-30 | Stop reason: HOSPADM

## 2019-08-19 RX ORDER — SULFAMETHOXAZOLE AND TRIMETHOPRIM 800; 160 MG/1; MG/1
1 TABLET ORAL 2 TIMES DAILY
Status: ON HOLD | COMMUNITY
Start: 2019-08-16 | End: 2019-08-30 | Stop reason: HOSPADM

## 2019-08-19 RX ORDER — LEVOTHYROXINE SODIUM 0.07 MG/1
75 TABLET ORAL DAILY
Status: ON HOLD | COMMUNITY
End: 2019-08-30 | Stop reason: HOSPADM

## 2019-08-19 RX ORDER — IPRATROPIUM BROMIDE AND ALBUTEROL SULFATE 2.5; .5 MG/3ML; MG/3ML
1 SOLUTION RESPIRATORY (INHALATION)
Status: DISCONTINUED | OUTPATIENT
Start: 2019-08-19 | End: 2019-08-19

## 2019-08-19 RX ORDER — SODIUM CHLORIDE 9 MG/ML
INJECTION, SOLUTION INTRAVENOUS CONTINUOUS
Status: DISCONTINUED | OUTPATIENT
Start: 2019-08-19 | End: 2019-08-25

## 2019-08-19 RX ORDER — LORAZEPAM 2 MG/ML
0.25 INJECTION INTRAMUSCULAR EVERY 6 HOURS PRN
Status: DISCONTINUED | OUTPATIENT
Start: 2019-08-19 | End: 2019-08-22

## 2019-08-19 RX ORDER — 0.9 % SODIUM CHLORIDE 0.9 %
30 INTRAVENOUS SOLUTION INTRAVENOUS ONCE
Status: COMPLETED | OUTPATIENT
Start: 2019-08-19 | End: 2019-08-19

## 2019-08-19 RX ORDER — LEVOTHYROXINE SODIUM ANHYDROUS 100 UG/5ML
37.5 INJECTION, POWDER, LYOPHILIZED, FOR SOLUTION INTRAVENOUS DAILY
Status: DISCONTINUED | OUTPATIENT
Start: 2019-08-25 | End: 2019-08-26

## 2019-08-19 RX ORDER — SODIUM CHLORIDE 0.9 % (FLUSH) 0.9 %
10 SYRINGE (ML) INJECTION EVERY 12 HOURS SCHEDULED
Status: DISCONTINUED | OUTPATIENT
Start: 2019-08-19 | End: 2019-08-30 | Stop reason: HOSPADM

## 2019-08-19 RX ORDER — LISINOPRIL 40 MG/1
40 TABLET ORAL DAILY
Status: ON HOLD | COMMUNITY
End: 2019-08-30 | Stop reason: HOSPADM

## 2019-08-19 RX ORDER — SODIUM CHLORIDE 0.9 % (FLUSH) 0.9 %
10 SYRINGE (ML) INJECTION PRN
Status: DISCONTINUED | OUTPATIENT
Start: 2019-08-19 | End: 2019-08-30 | Stop reason: HOSPADM

## 2019-08-19 RX ORDER — ONDANSETRON 2 MG/ML
4 INJECTION INTRAMUSCULAR; INTRAVENOUS EVERY 6 HOURS PRN
Status: DISCONTINUED | OUTPATIENT
Start: 2019-08-19 | End: 2019-08-30 | Stop reason: HOSPADM

## 2019-08-19 RX ADMIN — SODIUM CHLORIDE 1986 ML: 9 INJECTION, SOLUTION INTRAVENOUS at 14:26

## 2019-08-19 RX ADMIN — ENOXAPARIN SODIUM 30 MG: 30 INJECTION SUBCUTANEOUS at 16:57

## 2019-08-19 RX ADMIN — INSULIN LISPRO 10 UNITS: 100 INJECTION, SOLUTION INTRAVENOUS; SUBCUTANEOUS at 17:24

## 2019-08-19 RX ADMIN — SODIUM CHLORIDE: 9 INJECTION, SOLUTION INTRAVENOUS at 16:57

## 2019-08-19 RX ADMIN — LORAZEPAM 0.25 MG: 2 INJECTION INTRAMUSCULAR; INTRAVENOUS at 19:53

## 2019-08-19 RX ADMIN — IPRATROPIUM BROMIDE AND ALBUTEROL SULFATE 1 AMPULE: .5; 3 SOLUTION RESPIRATORY (INHALATION) at 20:21

## 2019-08-19 RX ADMIN — IPRATROPIUM BROMIDE AND ALBUTEROL SULFATE 1 AMPULE: .5; 3 SOLUTION RESPIRATORY (INHALATION) at 14:43

## 2019-08-19 RX ADMIN — IPRATROPIUM BROMIDE AND ALBUTEROL SULFATE 1 AMPULE: .5; 3 SOLUTION RESPIRATORY (INHALATION) at 14:38

## 2019-08-19 RX ADMIN — PIPERACILLIN AND TAZOBACTAM 4.5 G: 4; .5 INJECTION, POWDER, LYOPHILIZED, FOR SOLUTION INTRAVENOUS at 14:25

## 2019-08-19 RX ADMIN — INSULIN LISPRO 4 UNITS: 100 INJECTION, SOLUTION INTRAVENOUS; SUBCUTANEOUS at 20:04

## 2019-08-19 ASSESSMENT — PAIN SCALES - GENERAL
PAINLEVEL_OUTOF10: 0

## 2019-08-19 NOTE — ED PROVIDER NOTES
CT Head WO Contrast   Final Result   Probable early right MCA infarct with involvement of the right   temporal lobe and right basal ganglia. Recommend MRI scanning for   further evaluation as well as to clarify asymmetric findings at the   right caudate head. EKG: This EKG is signed by emergency department physician. Rate: 82  Rhythm: accelerated junctional  Interpretation: anterolateral ST depressions, LVH  Comparison: no previous EKG available         ------------------------- NURSING NOTES AND VITALS REVIEWED ---------------------------   The nursing notes within the ED encounter and vital signs as below have been reviewed. BP (!) 178/87   Pulse 96   Temp 97 °F (36.1 °C) (Temporal)   Resp 27   Ht 6' (1.829 m)   Wt 146 lb (66.2 kg)   SpO2 92%   BMI 19.80 kg/m²   Oxygen Saturation Interpretation: Abnormal    The patients available past medical records and past encounters were reviewed.         ------------------------------ ED COURSE/MEDICAL DECISION MAKING----------------------  Medications   sodium chloride flush 0.9 % injection 10 mL (has no administration in time range)   sodium chloride flush 0.9 % injection 10 mL (has no administration in time range)   magnesium hydroxide (MILK OF MAGNESIA) 400 MG/5ML suspension 30 mL (has no administration in time range)   ondansetron (ZOFRAN) injection 4 mg (has no administration in time range)   enoxaparin (LOVENOX) injection 30 mg (30 mg Subcutaneous Given 8/19/19 1657)   0.9 % sodium chloride infusion ( Intravenous New Bag 8/19/19 1657)   insulin lispro (HUMALOG) injection vial 0-12 Units (10 Units Subcutaneous Given 8/19/19 1724)   insulin lispro (HUMALOG) injection vial 0-6 Units (has no administration in time range)   levothyroxine (SYNTHROID) injection 37.5 mcg (has no administration in time range)   ipratropium-albuterol (DUONEB) nebulizer solution 1 ampule (has no administration in time range)   LORazepam (ATIVAN) injection 0.25 mg (has no administration in time range)   piperacillin-tazobactam (ZOSYN) 4.5 g in sodium chloride 0.9 % 100 mL IVPB (mini-bag) (0 g Intravenous Stopped 8/19/19 1456)   0.9 % sodium chloride bolus (0 mLs Intravenous Stopped 8/19/19 1657)             Acute CVA Core Measures:   Last Known Well : yesterday evening  NIH Stroke Scale Total: 20  t-PA Eligibility: IV t-PA was considered and not given due to violations in inclusion criteria including stroke onset was greater than 3 hours prior to presentation      Medical Decision Making:    Patient presents with exam consistent with severe stroke affecting left side of his body. Patient also in mild respiratory distress worsening initially adequate on 3 L nasal cannula over period of emergency department stay eventually requiring nonrebreather. X-ray shows complete white out of right hemithorax likely secondary to an aspiration pneumonia,  duo nebs and antibiotics. Long discussion with wife and son at bedside and they state patient would not want to be intubated and placed on a ventilator he would also not want chest compressions or any sort of resuscitation if his heart were to stop. However they are not ready to make him DNR CC and would like to treat his pneumonia. Re-Evaluations:            ED Course as of Aug 19 1926   Mon Aug 19, 2019   1327 Discussion with wife and son regarding results so far and plan. Started code status discussion and they do not want to make any decisions until more family arrives and they can all talk. [MF]   7402 Patient's worsening respiratory status, he is not requiring nonrebreather to maintain normal oxygenation status. Long discussion with family and they they state patient would not want to be intubated, he would not want CPR resuscitation if his heart were to stop. Patient's CODE STATUS be changed to DNR CCA. [MF]   0463 Discussed case with Dr. Jacque Glover- he will admit patient.      []      ED Course User Index  [MF] Isabelle Perdomo

## 2019-08-19 NOTE — ED NOTES
Bed: 05  Expected date:   Expected time:   Means of arrival:   Comments:  [id number] [facility] fire     Maryan Boggs RN  08/19/19 6780

## 2019-08-20 LAB
METER GLUCOSE: 158 MG/DL (ref 74–99)
METER GLUCOSE: 163 MG/DL (ref 74–99)
METER GLUCOSE: 165 MG/DL (ref 74–99)
METER GLUCOSE: 205 MG/DL (ref 74–99)
PROCALCITONIN: 0.3 NG/ML (ref 0–0.08)

## 2019-08-20 PROCEDURE — 6360000002 HC RX W HCPCS: Performed by: INTERNAL MEDICINE

## 2019-08-20 PROCEDURE — 97162 PT EVAL MOD COMPLEX 30 MIN: CPT

## 2019-08-20 PROCEDURE — 97530 THERAPEUTIC ACTIVITIES: CPT

## 2019-08-20 PROCEDURE — 92523 SPEECH SOUND LANG COMPREHEN: CPT

## 2019-08-20 PROCEDURE — 84145 PROCALCITONIN (PCT): CPT

## 2019-08-20 PROCEDURE — 99221 1ST HOSP IP/OBS SF/LOW 40: CPT | Performed by: PSYCHIATRY & NEUROLOGY

## 2019-08-20 PROCEDURE — 87040 BLOOD CULTURE FOR BACTERIA: CPT

## 2019-08-20 PROCEDURE — 36415 COLL VENOUS BLD VENIPUNCTURE: CPT

## 2019-08-20 PROCEDURE — 89220 SPUTUM SPECIMEN COLLECTION: CPT

## 2019-08-20 PROCEDURE — 2060000000 HC ICU INTERMEDIATE R&B

## 2019-08-20 PROCEDURE — 82962 GLUCOSE BLOOD TEST: CPT

## 2019-08-20 PROCEDURE — 94640 AIRWAY INHALATION TREATMENT: CPT

## 2019-08-20 PROCEDURE — 2700000000 HC OXYGEN THERAPY PER DAY

## 2019-08-20 PROCEDURE — 99222 1ST HOSP IP/OBS MODERATE 55: CPT | Performed by: NURSE PRACTITIONER

## 2019-08-20 PROCEDURE — 97166 OT EVAL MOD COMPLEX 45 MIN: CPT

## 2019-08-20 PROCEDURE — 6370000000 HC RX 637 (ALT 250 FOR IP): Performed by: INTERNAL MEDICINE

## 2019-08-20 PROCEDURE — 2580000003 HC RX 258: Performed by: INTERNAL MEDICINE

## 2019-08-20 RX ORDER — ALBUTEROL SULFATE 2.5 MG/3ML
2.5 SOLUTION RESPIRATORY (INHALATION) EVERY 6 HOURS
Status: DISCONTINUED | OUTPATIENT
Start: 2019-08-20 | End: 2019-08-30 | Stop reason: HOSPADM

## 2019-08-20 RX ADMIN — LORAZEPAM 0.25 MG: 2 INJECTION INTRAMUSCULAR; INTRAVENOUS at 03:31

## 2019-08-20 RX ADMIN — AMPICILLIN SODIUM AND SULBACTAM SODIUM 3 G: 2; 1 INJECTION, POWDER, FOR SOLUTION INTRAMUSCULAR; INTRAVENOUS at 12:46

## 2019-08-20 RX ADMIN — ALBUTEROL SULFATE 2.5 MG: 2.5 SOLUTION RESPIRATORY (INHALATION) at 14:04

## 2019-08-20 RX ADMIN — ENOXAPARIN SODIUM 30 MG: 30 INJECTION SUBCUTANEOUS at 09:19

## 2019-08-20 RX ADMIN — SODIUM CHLORIDE: 9 INJECTION, SOLUTION INTRAVENOUS at 18:09

## 2019-08-20 RX ADMIN — AMPICILLIN SODIUM AND SULBACTAM SODIUM 3 G: 2; 1 INJECTION, POWDER, FOR SOLUTION INTRAMUSCULAR; INTRAVENOUS at 23:07

## 2019-08-20 RX ADMIN — IPRATROPIUM BROMIDE AND ALBUTEROL SULFATE 1 AMPULE: .5; 3 SOLUTION RESPIRATORY (INHALATION) at 10:17

## 2019-08-20 RX ADMIN — Medication 10 ML: at 21:02

## 2019-08-20 RX ADMIN — LORAZEPAM 0.25 MG: 2 INJECTION INTRAMUSCULAR; INTRAVENOUS at 22:00

## 2019-08-20 ASSESSMENT — PAIN SCALES - GENERAL: PAINLEVEL_OUTOF10: 0

## 2019-08-20 NOTE — PROGRESS NOTES
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 To improve orientation               MOTOR SPEECH       Oral Peripheral Examination   Could not test    Parameters of Speech Production  Respiration:  Shortness of breath  Articulation:  Distortion (although minimal vebalizations)  Resonance:  Could not test  Quality:   Breathy  Pitch:    Could not test  Intensity: Could not test  Fluency:  Could not test    Prosody Could not test    RECEPTIVE LANGUAGE    Comprehension of Yes/No Questions:   Cueing    Process  Simple Verbal Commands:   Inconsistent and Cueing  Process Intermediate Verbal Commands:   Unable  Process Complex Verbal Commands:     Unable    Comprehension of Conversation:      Unable      EXPRESSIVE LANGUAGE     Serials: Impaired    Imitation:  Words   Impaired   Sentences Impaired    Naming:  (Modality used:  Verbal)  Confrontation Naming  Impaired  Functional Description  Impaired  Response Naming: Impaired    Conversation:      Minimal verbal attempts made    COGNITION     Attention/Orientation  Attention:difficult to keep fully awakened to participate  Orientation:  Oriented to Person                       Prognosis for improvements is fair  This plan will be re-evaluated and revised in 1 week  if warranted.   Patient stated goals: Could not state    The admitting diagnosis and active problem

## 2019-08-20 NOTE — CONSULTS
he had eyelid apraxia. CT shows right MCA territory stroke. Has right lung pneumonia vs edema. Also he has some changes in ECG (primary or secondary to stroke?). I had a long discussion with the son and daughter in law. The prognosis, the risk of edema and the other aspects were described to them and they expressed understanding. The prognosis can be highly variable if edema happens. No AP or AC now. If he can swallow, ASA 81 mg can be started on 8/22. I will request carotid US. Meanwhile we will monitor cardiac rhythm and TTE if the family decide to pursue more treatment. CT can be repeated on 8/21 or sooner if he worsens. I will involve palliative service.      Beto Barreto MD

## 2019-08-20 NOTE — CONSULTS
file    Stress: Not on file   Relationships    Social connections:     Talks on phone: Not on file     Gets together: Not on file     Attends Synagogue service: Not on file     Active member of club or organization: Not on file     Attends meetings of clubs or organizations: Not on file     Relationship status: Not on file    Intimate partner violence:     Fear of current or ex partner: Not on file     Emotionally abused: Not on file     Physically abused: Not on file     Forced sexual activity: Not on file   Other Topics Concern    Not on file   Social History Narrative    Not on file       Current Facility-Administered Medications   Medication Dose Route Frequency Provider Last Rate Last Dose    sodium chloride flush 0.9 % injection 10 mL  10 mL Intravenous 2 times per day Mindi Key, DO        sodium chloride flush 0.9 % injection 10 mL  10 mL Intravenous PRN Mindi Key DO        magnesium hydroxide (MILK OF MAGNESIA) 400 MG/5ML suspension 30 mL  30 mL Oral Daily PRN Mindi Key DO        ondansetron [facility]) injection 4 mg  4 mg Intravenous Q6H PRN Mindi Key DO        enoxaparin (LOVENOX) injection 30 mg  30 mg Subcutaneous Daily Mindi Key, DO   30 mg at 08/20/19 0919    0.9 % sodium chloride infusion   Intravenous Continuous Mindi Key, DO 75 mL/hr at 08/19/19 1657      insulin lispro (HUMALOG) injection vial 0-12 Units  0-12 Units Subcutaneous TID [facility] Mindi Key, DO   10 Units at 08/19/19 1724    insulin lispro (HUMALOG) injection vial 0-6 Units  0-6 Units Subcutaneous Nightly Mindi Key, DO   4 Units at 08/19/19 2004    [START ON 8/25/2019] levothyroxine (SYNTHROID) injection 37.5 mcg  37.5 mcg Intravenous Daily Mindi Key, DO        ipratropium-albuterol (DUONEB) nebulizer solution 1 ampule  1 ampule Inhalation Q4H [place] Mindi Key, DO   1 ampule at 08/20/19 1017    LORazepam (ATIVAN) injection 0.25 mg  0.25 mg Intravenous Q6H PRN Check respiratory culture and procalcitonin  3. Albuterol  Nebs scheduled  4. Aspiration precautions and NPO  5. Empiric antibiotics with unasyn  6. Chest physiotherapy  7. Neurology and palliative care consulted     Thank you for allowing me to participate in the care of [facility].        Acosta Dale MD  8/20/2019 10:32 AM

## 2019-08-20 NOTE — CONSULTS
NOK: Spouse    Contacts:  Stephanie Pyle [phone]    - Spiritual assessment: No spiritual distress identified     - Bereavement and grief: to be determined    - Discharge planning: to be determined    - Prognosis: Guarded    - Referrals to: none today    Time/Communication  Greater than 51% of time spent, total 50 minutes in counseling and coordination of care at the bedside regarding goals of care, diagnosis and prognosis and see above. King Gavin ESTEBAN  Palliative Medicine    Discussed patient and the plan of care with the other IDT members of Palliative Med team and with consultants, patient and family. Thank you for allowing Palliative Medicine to participate in the care of [facility]. Note: This report was completed using computerCodeSquare voiced recognition software. Every effort has been made to ensure accuracy; however, inadvertent computerized transcription errors may be present.

## 2019-08-20 NOTE — H&P
without lesion. Pharynx:   Noninjected without exudate. NECK:  Supple. No JVD. No thyromegaly. No carotid bruit. There is a  right carotid endarterectomy scar. HEART:  Regular rate and rhythm with grade 1/6 systolic murmur. LUNGS:  With diffuse rhonchi. ABDOMEN:  Positive bowel sounds, soft, nontender. No rebound, no  guarding, no hepatosplenomegaly, no masses. BACK:  With increased thoracic kyphosis. EXTREMITIES:  With left upper and lower extremity weakness. LYMPH NODES:  No adenopathy. SKIN:  Without rash or lesion. IMPRESSION:  Acute CVA, acute respiratory failure with hypoxia,  insulin-requiring diabetes mellitus, hypothyroidism, hypertension,  hyperlipidemia, coronary artery disease, cerebrovascular disease. PLAN:  Admit. DNR/CCA per family request.  Pulmonary and Neurology to  see. Neuro checks. Aerosols. Empiric antibiotics per Pulmonary. PT,  OT, speech/swallow eval.    DISCHARGE PLAN:  Home when stable.         Yaneth Brantley DO    D: 08/20/2019 9:26:07       T: 08/20/2019 9:31:07     [email or web address]  Job#: [id number]     Doc#: [id number]    CC:

## 2019-08-20 NOTE — PROGRESS NOTES
Basic grooming task with R UE  Stand by Assist    UB Dressing Dependent   Minimal Assist    LB Dressing Dependent   Moderate Assist    Bathing Dependent  Moderate Assist    Toileting Dependent   Moderate Assist    Bed Mobility  Supine to sit: Dependent x2  Sit to supine: Dependent x2  Supine to sit: Moderate Assist   Sit to supine: Moderate Assist    Functional Transfers NT   Maximal Assist     Functional Mobility NT       Balance Sitting: Max A (episodes on Mod A)  Posterior / L lateral anita     Standing: NT     Activity Tolerance Poor  F   Visual/  Perceptual L lateral head rotation / gaze,   Eyes shut majority of session; continue to assess                  Hand dominance: right     Strength ROM Additional Info:    RUE   4/5 wfl good  and wfl FMC/dexterity noted during ADL tasks     LUE NT PROM WFL  No observed AROM Absent  and absent FMC/dexterity      Hearing: wfl  Sensation: continue to assess  Tone: wfl  Edema:none noted                             Comments/Treatment: Upon arrival, patient supine in bed and agreeable to OT Session with PT collaboration - family present. Therapist facilitated bed mobility (rolling, supine<.sit) and sitting balance at EOB  (L lateral / posterior lean max A with episodes on Mod A;  Cuing on posture, weightshifting and activity tolerance) - skilled cuing on sequencing, hand placement, posture, body mechanics and safety. Therapist facilitated L UE PROM and skilled positioning of L UE / LE. Therapist facilitated self-care retraining: UB/LB self-care tasks (simulated gown/socsk) and grooming task (face wash / haircare) while educating pt on sequencing, modified techniques, posture, safety and energy conservation techniques. Skilled monitoring of HR, O2 sats and pts response to treatment. Pt demonstrating poor understanding of education/techniques, requiring additional training / education.  At end of session, patient semi-supine in bed with call light and phone within

## 2019-08-21 ENCOUNTER — APPOINTMENT (OUTPATIENT)
Dept: ULTRASOUND IMAGING | Age: 84
DRG: 064 | End: 2019-08-21
Payer: MEDICARE

## 2019-08-21 ENCOUNTER — APPOINTMENT (OUTPATIENT)
Dept: GENERAL RADIOLOGY | Age: 84
DRG: 064 | End: 2019-08-21
Payer: MEDICARE

## 2019-08-21 ENCOUNTER — APPOINTMENT (OUTPATIENT)
Dept: CT IMAGING | Age: 84
DRG: 064 | End: 2019-08-21
Payer: MEDICARE

## 2019-08-21 LAB
ANION GAP SERPL CALCULATED.3IONS-SCNC: 10 MMOL/L (ref 7–16)
BUN BLDV-MCNC: 32 MG/DL (ref 8–23)
CALCIUM SERPL-MCNC: 8.5 MG/DL (ref 8.6–10.2)
CHLORIDE BLD-SCNC: 107 MMOL/L (ref 98–107)
CO2: 25 MMOL/L (ref 22–29)
CREAT SERPL-MCNC: 1.3 MG/DL (ref 0.7–1.2)
GFR AFRICAN AMERICAN: >60
GFR NON-AFRICAN AMERICAN: 52 ML/MIN/1.73
GLUCOSE BLD-MCNC: 255 MG/DL (ref 74–99)
HCT VFR BLD CALC: 38.9 % (ref 37–54)
HEMOGLOBIN: 12.3 G/DL (ref 12.5–16.5)
MCH RBC QN AUTO: 28.5 PG (ref 26–35)
MCHC RBC AUTO-ENTMCNC: 31.6 % (ref 32–34.5)
MCV RBC AUTO: 90 FL (ref 80–99.9)
METER GLUCOSE: 205 MG/DL (ref 74–99)
METER GLUCOSE: 212 MG/DL (ref 74–99)
METER GLUCOSE: 240 MG/DL (ref 74–99)
METER GLUCOSE: 244 MG/DL (ref 74–99)
PDW BLD-RTO: 15.9 FL (ref 11.5–15)
PLATELET # BLD: 215 E9/L (ref 130–450)
PMV BLD AUTO: 10.2 FL (ref 7–12)
POTASSIUM SERPL-SCNC: 4.9 MMOL/L (ref 3.5–5)
RBC # BLD: 4.32 E12/L (ref 3.8–5.8)
SODIUM BLD-SCNC: 142 MMOL/L (ref 132–146)
WBC # BLD: 14.5 E9/L (ref 4.5–11.5)

## 2019-08-21 PROCEDURE — 97127 HC SP THER IVNTJ W/FOCUS COG FUNCJ: CPT

## 2019-08-21 PROCEDURE — 70450 CT HEAD/BRAIN W/O DYE: CPT

## 2019-08-21 PROCEDURE — 97535 SELF CARE MNGMENT TRAINING: CPT

## 2019-08-21 PROCEDURE — 2580000003 HC RX 258: Performed by: INTERNAL MEDICINE

## 2019-08-21 PROCEDURE — 2060000000 HC ICU INTERMEDIATE R&B

## 2019-08-21 PROCEDURE — 85027 COMPLETE CBC AUTOMATED: CPT

## 2019-08-21 PROCEDURE — 2580000003 HC RX 258: Performed by: NURSE PRACTITIONER

## 2019-08-21 PROCEDURE — 6360000002 HC RX W HCPCS: Performed by: INTERNAL MEDICINE

## 2019-08-21 PROCEDURE — 97110 THERAPEUTIC EXERCISES: CPT

## 2019-08-21 PROCEDURE — 82962 GLUCOSE BLOOD TEST: CPT

## 2019-08-21 PROCEDURE — 97112 NEUROMUSCULAR REEDUCATION: CPT

## 2019-08-21 PROCEDURE — 2700000000 HC OXYGEN THERAPY PER DAY

## 2019-08-21 PROCEDURE — 94640 AIRWAY INHALATION TREATMENT: CPT

## 2019-08-21 PROCEDURE — 36415 COLL VENOUS BLD VENIPUNCTURE: CPT

## 2019-08-21 PROCEDURE — 99233 SBSQ HOSP IP/OBS HIGH 50: CPT | Performed by: EMERGENCY MEDICINE

## 2019-08-21 PROCEDURE — 94667 MNPJ CHEST WALL 1ST: CPT

## 2019-08-21 PROCEDURE — 80048 BASIC METABOLIC PNL TOTAL CA: CPT

## 2019-08-21 PROCEDURE — 93880 EXTRACRANIAL BILAT STUDY: CPT

## 2019-08-21 PROCEDURE — 97530 THERAPEUTIC ACTIVITIES: CPT

## 2019-08-21 PROCEDURE — 71045 X-RAY EXAM CHEST 1 VIEW: CPT

## 2019-08-21 PROCEDURE — 99232 SBSQ HOSP IP/OBS MODERATE 35: CPT | Performed by: PHYSICIAN ASSISTANT

## 2019-08-21 RX ORDER — SODIUM CHLORIDE FOR INHALATION 3 %
4 VIAL, NEBULIZER (ML) INHALATION PRN
Status: DISCONTINUED | OUTPATIENT
Start: 2019-08-21 | End: 2019-08-27

## 2019-08-21 RX ADMIN — AMPICILLIN SODIUM AND SULBACTAM SODIUM 3 G: 2; 1 INJECTION, POWDER, FOR SOLUTION INTRAMUSCULAR; INTRAVENOUS at 11:27

## 2019-08-21 RX ADMIN — ALBUTEROL SULFATE 2.5 MG: 2.5 SOLUTION RESPIRATORY (INHALATION) at 17:54

## 2019-08-21 RX ADMIN — SODIUM CHLORIDE: 9 INJECTION, SOLUTION INTRAVENOUS at 19:48

## 2019-08-21 RX ADMIN — SODIUM CHLORIDE SOLN NEBU 3% 4 ML: 3 NEBU SOLN at 17:55

## 2019-08-21 RX ADMIN — AMPICILLIN SODIUM AND SULBACTAM SODIUM 3 G: 2; 1 INJECTION, POWDER, FOR SOLUTION INTRAMUSCULAR; INTRAVENOUS at 23:15

## 2019-08-21 RX ADMIN — LORAZEPAM 0.25 MG: 2 INJECTION INTRAMUSCULAR; INTRAVENOUS at 22:18

## 2019-08-21 RX ADMIN — ENOXAPARIN SODIUM 30 MG: 30 INJECTION SUBCUTANEOUS at 11:27

## 2019-08-21 RX ADMIN — SODIUM CHLORIDE: 9 INJECTION, SOLUTION INTRAVENOUS at 06:36

## 2019-08-21 RX ADMIN — ALBUTEROL SULFATE 2.5 MG: 2.5 SOLUTION RESPIRATORY (INHALATION) at 11:34

## 2019-08-21 RX ADMIN — ALBUTEROL SULFATE 2.5 MG: 2.5 SOLUTION RESPIRATORY (INHALATION) at 02:23

## 2019-08-21 ASSESSMENT — PAIN SCALES - GENERAL
PAINLEVEL_OUTOF10: 0

## 2019-08-21 NOTE — PROGRESS NOTES
Nebulization Q6H    ampicillin-sulbactam  3 g Intravenous Q12H    sodium chloride flush  10 mL Intravenous 2 times per day    enoxaparin  30 mg Subcutaneous Daily    insulin lispro  0-12 Units Subcutaneous TID WC    insulin lispro  0-6 Units Subcutaneous Nightly    [START ON 8/25/2019] levothyroxine  37.5 mcg Intravenous Daily       Physical Exam:  General Appearance: appears comfortable in no acute distress. HEENT: Normocephalic atraumatic without obvious abnormality   Neck: Lips, mucosa, and tongue normal.  Supple, symmetrical, trachea midline, no adenopathy;thyroid:  no enlargement/tenderness/nodules or JVD. Lung: Breath sounds diminished R>L. Few crackles no active wheeze. Respirations   unlabored. Symmetrical expansion. Heart: RRR, normal S1, S2. No MRG  Abdomen: Soft, NT, ND. BS present x 4 quadrants. No bruit or organomegaly. Extremities: Pedal pulses 2+ symmetric b/l. Extremities normal, no cyanosis, clubbing, or edema. Musculokeletal: No joint swelling, no muscle tenderness. ROM normal in all joints of extremities. Neurologic: Mental status: unable to move left. some aphasia and left lateral gaze     Pertinent/ New Labs and Imaging Studies     Imaging Personally Reviewed:  8/21/2019 chest x-ray  Findings: There is a stable, enlarged cardiomediastinal silhouette   with underlying pulmonary edema, multifocal airspace disease, right   greater than left, with bilateral pleural effusions and thoracic   aortic vascular calcifications. . No pneumothorax.           Impression   1. Multifocal airspace disease, right greater than left, concerning   for multifocal bilateral infiltrate/pneumonia. Continued follow-up to   complete resolution is recommended. 2. Bilateral pleural effusions. 3. Stable, enlarged cardiac mediastinal silhouette with underlying   pulmonary edema and thoracic aortic vascular calcifications.            CT head 8/21/19       FINDINGS:   The large right MCA infarct demonstrates

## 2019-08-21 NOTE — PROGRESS NOTES
aphasic but able to speak minimally. Pt able to follow simple, one step commands <25% of the time. Pt's eyes remained closed for majority of session but pt able to participate with therapy. Pt required total assistance x2 during supine to sit transfer. Pt had strong posterior and L lateral lean throughout session while pt sitting EOB. Pt able to follow directions. Pt sat EOB for 25 minutes. MaxA. Pt required cues to keep head positioned to midline. Pt returned to bed and repositioned with pillows and HOB elevated. Pt given call light. Bed alarm applied.        Time in: 8:40  Time out: 9:20    Jd Frederick [id number]

## 2019-08-21 NOTE — PROGRESS NOTES
planning: to be determined    Referrals to: none today    Discussed patient and the plan of care with the other IDT members of the Palliative Care Team, and with family and floor nurse. Symptom Management:    Symptom Medications Number Doses in Past 24 hrs   Pain No symptoms management at this time. Nausea/vomiting     Bowel Regime/constipation     Anxiety/agitation/depression     SOB     Appetite       Current Medications:  Inpatient medications reviewed: yes. Home Medications reviewed: yes. Subjective:     Subjective/Events: The patient remains obtunded and does not respond to verbal or painful stimuli except for an occasional groaning or moaning. He will not open his eyes. He will not move his arms or legs to verbal or painful stimuli. Unable to assess further due to patient's current medical condition. Goals of care: live longer, improve or maintain function/quality of life, remain at home, preserve independence/autonomy/control and continue current management  Advance Directives: DNR-CCA with NO Intubation. Surrogate: Spouse and Child  Prognosis: unknown  Spiritual assessment: No spiritual distress identified   Bereavement and grief:  to be determined    Past Medical History:   Diagnosis Date    CVA (cerebral vascular accident) ([facility] 08/19/2019    Diabetes mellitus ([facility]        History reviewed. No pertinent surgical history. History reviewed. No pertinent family history. Unable to obtain family history due to altered mental status    No Known Allergies    Review of Systems:   See palliative care ROS/ESAS below; Detail ROS unable to obtain due to patient's mental status    Social history:  Veteranstatus: yes  Marital status:   Living status: with family:  spouse     Family Meeting:  Participants:Spouse and two sons who were present in the patient's room.   Family meeting was held to discuss:diagnosis, prognosis, treatment options, goals of care, prior expressed wishes, advanced

## 2019-08-21 NOTE — PROGRESS NOTES
Basic grooming task with R UE  Max A;    [place] to wash face and head while in supine. Stand by Assist    UB Dressing Dependent  Dep; To latoya gown properly while sitting up on the EOB.     Minimal Assist    LB Dressing Dependent  Dep;    Due to limitation of ROM and safety.     Moderate Assist    Bathing Dependent  Dep; Pt requires Dep A to complete all bathing tasks. Moderate Assist    Toileting Dependent   Dep; Pt incontinent    Moderate Assist    Bed Mobility  Supine to sit: Dependent x2  Sit to supine: Dependent x2 Sup <> Sit: Dep x 2; To transfer from supine to sitting position with Dep A of 2 and increased time. Pt requiring Max verbal prompting to improve sitting balance due to pt having a posterior/ L lateral lean. Patient able to sit up on the EOB with assistance for 25 minutes.       Supine to sit: Moderate Assist   Sit to supine: Moderate Assist    Functional Transfers NT  N/A  Maximal Assist     Functional Mobility NT   N/A     Balance Sitting: Max A (episodes on Mod A)  Posterior / L lateral anita     Standing: NT Sitting: Max A (episodes of Mod A with verbal prompts)  Posterior / L lateral anita     Standing: NA     Activity Tolerance Poor  Poor F   Visual/  Perceptual L lateral head rotation / gaze,   Eyes shut majority of session; continue to assess     L lateral head rotation with Eyes shut for most of the treatment. Prior to exiting room positioned pt and educated sons on improving head position.                     Hand dominance: right       Strength ROM Additional Info:    RUE   4/5 wfl good  and wfl FMC/dexterity noted during ADL tasks      LUE NT PROM WFL  No observed AROM Absent  and absent FMC/dexterity       Hearing: wfl  Sensation: continue to assess  Tone: wfl  Edema:none noted                             Comments/Treatment: Upon arrival, patient supine in bed and agreeable to OT Session with PTA collaboration - family present.  Therapist facilitated bed mobility (rolling, supine<.sit) and sitting balance at EOB  (L lateral / posterior lean max A with episodes on Mod A;  Cuing on posture, weightshifting and activity tolerance) - skilled cuing on sequencing, hand placement, posture, body mechanics and safety. Therapist facilitated L UE PROM and skilled positioning of L UE / LE. Therapist facilitated self-care retraining: UB/LB self-care tasks (simulated gown/socks) and grooming task (face wash / haircare) while educating pt on sequencing, modified techniques, posture, safety and energy conservation techniques. Pt demonstrating poor+ understanding of education/techniques, requiring additional training/education. At end of session, patient semi-supine in bed with pillows to support head posture and call light and phone within reach, all lines and tubes intact. Pt would benefit from continued skilled OT to increase functional independence and quality of life. · Pt has made fair progress towards set goals.    · Continue with current plan of care    Time in: 0840  Time out: 0920  Total Tx Time: 40 mins    Kalyani Tillman 46, [address]

## 2019-08-22 ENCOUNTER — APPOINTMENT (OUTPATIENT)
Dept: GENERAL RADIOLOGY | Age: 84
DRG: 064 | End: 2019-08-22
Payer: MEDICARE

## 2019-08-22 LAB
CHOLESTEROL, TOTAL: 197 MG/DL (ref 0–199)
HBA1C MFR BLD: 9.7 % (ref 4–5.6)
HDLC SERPL-MCNC: 52 MG/DL
LDL CHOLESTEROL CALCULATED: 123 MG/DL (ref 0–99)
METER GLUCOSE: 139 MG/DL (ref 74–99)
METER GLUCOSE: 161 MG/DL (ref 74–99)
METER GLUCOSE: 172 MG/DL (ref 74–99)
METER GLUCOSE: 285 MG/DL (ref 74–99)
TRIGL SERPL-MCNC: 111 MG/DL (ref 0–149)
VLDLC SERPL CALC-MCNC: 22 MG/DL

## 2019-08-22 PROCEDURE — 2700000000 HC OXYGEN THERAPY PER DAY

## 2019-08-22 PROCEDURE — 97110 THERAPEUTIC EXERCISES: CPT

## 2019-08-22 PROCEDURE — 6360000002 HC RX W HCPCS: Performed by: INTERNAL MEDICINE

## 2019-08-22 PROCEDURE — 2709999900 FL MODIFIED BARIUM SWALLOW W VIDEO

## 2019-08-22 PROCEDURE — 2580000003 HC RX 258: Performed by: INTERNAL MEDICINE

## 2019-08-22 PROCEDURE — 83036 HEMOGLOBIN GLYCOSYLATED A1C: CPT

## 2019-08-22 PROCEDURE — 6370000000 HC RX 637 (ALT 250 FOR IP): Performed by: INTERNAL MEDICINE

## 2019-08-22 PROCEDURE — 94640 AIRWAY INHALATION TREATMENT: CPT

## 2019-08-22 PROCEDURE — 97530 THERAPEUTIC ACTIVITIES: CPT

## 2019-08-22 PROCEDURE — 2580000003 HC RX 258: Performed by: NURSE PRACTITIONER

## 2019-08-22 PROCEDURE — 92611 MOTION FLUOROSCOPY/SWALLOW: CPT

## 2019-08-22 PROCEDURE — 82962 GLUCOSE BLOOD TEST: CPT

## 2019-08-22 PROCEDURE — 36415 COLL VENOUS BLD VENIPUNCTURE: CPT

## 2019-08-22 PROCEDURE — 80061 LIPID PANEL: CPT

## 2019-08-22 PROCEDURE — 97032 APPL MODALITY 1+ESTIM EA 15: CPT

## 2019-08-22 PROCEDURE — 2060000000 HC ICU INTERMEDIATE R&B

## 2019-08-22 PROCEDURE — 97112 NEUROMUSCULAR REEDUCATION: CPT

## 2019-08-22 PROCEDURE — 99232 SBSQ HOSP IP/OBS MODERATE 35: CPT | Performed by: PHYSICIAN ASSISTANT

## 2019-08-22 RX ORDER — FORMOTEROL FUMARATE 20 UG/2ML
20 SOLUTION RESPIRATORY (INHALATION) 2 TIMES DAILY
Status: DISCONTINUED | OUTPATIENT
Start: 2019-08-22 | End: 2019-08-30 | Stop reason: HOSPADM

## 2019-08-22 RX ORDER — METHYLPREDNISOLONE SODIUM SUCCINATE 40 MG/ML
20 INJECTION, POWDER, LYOPHILIZED, FOR SOLUTION INTRAMUSCULAR; INTRAVENOUS EVERY 12 HOURS
Status: DISCONTINUED | OUTPATIENT
Start: 2019-08-22 | End: 2019-08-27

## 2019-08-22 RX ORDER — BUDESONIDE 0.25 MG/2ML
250 INHALANT ORAL 2 TIMES DAILY
Status: DISCONTINUED | OUTPATIENT
Start: 2019-08-22 | End: 2019-08-30 | Stop reason: HOSPADM

## 2019-08-22 RX ORDER — LORAZEPAM 2 MG/ML
0.25 INJECTION INTRAMUSCULAR NIGHTLY PRN
Status: DISCONTINUED | OUTPATIENT
Start: 2019-08-22 | End: 2019-08-30 | Stop reason: HOSPADM

## 2019-08-22 RX ADMIN — SODIUM CHLORIDE: 9 INJECTION, SOLUTION INTRAVENOUS at 11:39

## 2019-08-22 RX ADMIN — INSULIN LISPRO 1 UNITS: 100 INJECTION, SOLUTION INTRAVENOUS; SUBCUTANEOUS at 21:50

## 2019-08-22 RX ADMIN — ALBUTEROL SULFATE 2.5 MG: 2.5 SOLUTION RESPIRATORY (INHALATION) at 02:21

## 2019-08-22 RX ADMIN — INSULIN LISPRO 2 UNITS: 100 INJECTION, SOLUTION INTRAVENOUS; SUBCUTANEOUS at 11:45

## 2019-08-22 RX ADMIN — ALBUTEROL SULFATE 2.5 MG: 2.5 SOLUTION RESPIRATORY (INHALATION) at 12:38

## 2019-08-22 RX ADMIN — AMPICILLIN SODIUM AND SULBACTAM SODIUM 3 G: 2; 1 INJECTION, POWDER, FOR SOLUTION INTRAMUSCULAR; INTRAVENOUS at 11:45

## 2019-08-22 RX ADMIN — AMPICILLIN SODIUM AND SULBACTAM SODIUM 3 G: 2; 1 INJECTION, POWDER, FOR SOLUTION INTRAMUSCULAR; INTRAVENOUS at 21:50

## 2019-08-22 RX ADMIN — FORMOTEROL FUMARATE DIHYDRATE 20 MCG: 20 SOLUTION RESPIRATORY (INHALATION) at 21:29

## 2019-08-22 RX ADMIN — SODIUM CHLORIDE SOLN NEBU 3% 4 ML: 3 NEBU SOLN at 12:39

## 2019-08-22 RX ADMIN — Medication 10 ML: at 20:39

## 2019-08-22 RX ADMIN — BUDESONIDE 250 MCG: 0.25 SUSPENSION RESPIRATORY (INHALATION) at 21:29

## 2019-08-22 RX ADMIN — ENOXAPARIN SODIUM 30 MG: 30 INJECTION SUBCUTANEOUS at 11:45

## 2019-08-22 RX ADMIN — METHYLPREDNISOLONE SODIUM SUCCINATE 20 MG: 40 INJECTION, POWDER, FOR SOLUTION INTRAMUSCULAR; INTRAVENOUS at 17:10

## 2019-08-22 RX ADMIN — INSULIN LISPRO 6 UNITS: 100 INJECTION, SOLUTION INTRAVENOUS; SUBCUTANEOUS at 06:05

## 2019-08-22 RX ADMIN — SODIUM CHLORIDE SOLN NEBU 3% 4 ML: 3 NEBU SOLN at 08:28

## 2019-08-22 RX ADMIN — ALBUTEROL SULFATE 2.5 MG: 2.5 SOLUTION RESPIRATORY (INHALATION) at 21:30

## 2019-08-22 RX ADMIN — ALBUTEROL SULFATE 2.5 MG: 2.5 SOLUTION RESPIRATORY (INHALATION) at 08:28

## 2019-08-22 ASSESSMENT — PAIN SCALES - GENERAL
PAINLEVEL_OUTOF10: 0
PAINLEVEL_OUTOF10: 0

## 2019-08-22 NOTE — PLAN OF CARE
Problem: Falls - Risk of:  Goal: Will remain free from falls  Description  Will remain free from falls  Outcome: Met This Shift  Goal: Absence of physical injury  Description  Absence of physical injury  Outcome: Met This Shift     Problem: Risk for Impaired Skin Integrity  Goal: Tissue integrity - skin and mucous membranes  Description  Structural intactness and normal physiological function of skin and  mucous membranes.   Outcome: Met This Shift     Problem: HEMODYNAMIC STATUS  Goal: Patient has stable vital signs and fluid balance  Outcome: Met This Shift

## 2019-08-22 NOTE — PROGRESS NOTES
SPEECH/LANGUAGE PATHOLOGY  VIDEOFLUOROSCOPIC STUDY OF SWALLOWING (MBS)      PATIENT NAME:  Anand Fish      :  1927      TODAY'S DATE:  2019    SUMMARY OF EVALUATION     DYSPHAGIA DIAGNOSIS:  Severe oropharyngeal dysphagia      DIET RECOMMENDATIONS:  NPO (nothing by mouth including oral meds)       FEEDING RECOMMENDATIONS:     Assistance level:  Not applicable      Compensatory strategies recommended: Not applicable    THERAPY RECOMMENDATIONS:      Dysphagia therapy is not recommended due to cognitive statis               PROCEDURE     Consistencies Administered During the Evaluation   Liquids: honey thick liquid   Solids:  pureed foods      Method of Intake:   spoon  Fed by clinician      Position:   Seated, upright, Lateral plane    Current Respiratory Status   nasal canula                RESULTS     ORAL STAGE       Delayed A-P transit due to: reduced lingual strength  and cognitive function         PHARYNGEAL STAGE           ONSET TIME     Delayed initiation of the pharyngeal swallow was noted with swallow reflex triggered at the level of the vallecula       PHARYNGEAL RESIDUALS          Vallecula/Pharyngeal Wall           Reduced tongue base retraction resulting in residuals in vallecula and/or posterior pharyngeal wall for honey consistency liquid and pureed foods which did not clear with cued double swallow      Pyriform Sinuses      Residuals in the pyriform sinuses were noted due to pharyngeal weakness for honey consistency liquid and pureed foods which did not clear with cued double swallow    LARYNGEAL PENETRATION     Laryngeal penetration occurred prior to aspiration. Further details under aspiration section. ASPIRATION    Aspiration occurred DURING the swallow for honey consistency liquid and pureed foods due to  inadequate laryngeal closure . Aspiration was marked and occurred consistently . an absent cough/throat clear was noted, Aspiration occurred AFTER the swallow for honey consistency liquid and pureed foods due to pharyngeal residuals . Aspiration was marked and occurred consistently . an absent cough/throat clear was noted         COMPENSATORY STRATEGIES       Compensatory strategies were not attempted      STRUCTURAL/FUNCTIONAL ANOMALIES       No structural/functional anomalies were noted      CERVICAL ESOPHAGEAL STAGE :        The cervical esophagus appeared adequate                              [x]The admitting diagnosis and active problem list, as listed below have been reviewed prior to initiation of this evaluation.      ADMITTING DIAGNOSIS: Acute ischemic right MCA stroke ([facility] [I63.511]  Acute ischemic right MCA stroke ([facility] [I63.511]     ACTIVE PROBLEM LIST:   Patient Active Problem List   Diagnosis    [facility]    Acute respiratory failure with hypoxia (HCC)    Type 2 diabetes mellitus, with long-term current use of insulin (HCC)    Hypertension    Hyperlipidemia    [facility] ([facility]    NSTEMI (non-ST elevated myocardial infarction) ([facility]    Goals of care, counseling/discussion    Palliative care encounter

## 2019-08-22 NOTE — PROGRESS NOTES
BMI 19.80 kg/m²     Heart:  Reg  Lungs:  rhonchi  Abd: bowel sounds present, nontender, nondistended, no masses  Extrem:  Edema legs    CBC with Differential:    Lab Results   Component Value Date    WBC 14.5 08/21/2019    RBC 4.32 08/21/2019    HGB 12.3 08/21/2019    HCT 38.9 08/21/2019     08/21/2019    MCV 90.0 08/21/2019    MCH 28.5 08/21/2019    MCHC 31.6 08/21/2019    RDW 15.9 08/21/2019    LYMPHOPCT 7.4 08/19/2019    MONOPCT 2.6 08/19/2019    BASOPCT 0.2 08/19/2019    MONOSABS 0.34 08/19/2019    LYMPHSABS 0.96 08/19/2019    EOSABS 0.02 08/19/2019    BASOSABS 0.03 08/19/2019     CMP:    Lab Results   Component Value Date     08/21/2019    K 4.9 08/21/2019     08/21/2019    CO2 25 08/21/2019    BUN 32 08/21/2019    CREATININE 1.3 08/21/2019    GFRAA >60 08/21/2019    LABGLOM 52 08/21/2019    GLUCOSE 255 08/21/2019    PROT 7.4 08/19/2019    LABALBU 4.1 08/19/2019    CALCIUM 8.5 08/21/2019    BILITOT 0.6 08/19/2019    ALKPHOS 77 08/19/2019    AST 23 08/19/2019    ALT 15 08/19/2019     Warfarin PT/INR:    Lab Results   Component Value Date    INR 1.1 08/19/2019    PROTIME 12.3 08/19/2019       Assessment:    Active Problems:    Acute ischemic right MCA stroke (HCC)    Acute respiratory failure with hypoxia (HCC)    Type 2 diabetes mellitus, with long-term current use of insulin (HCC)    Hypertension    Hyperlipidemia    Aspiration pneumonia of right lung (HCC)    NSTEMI (non-ST elevated myocardial infarction) ([facility]    Goals of care, counseling/discussion    Palliative care encounter  Resolved Problems:    * No resolved hospital problems.  *      Plan:  Speech/swallow reeval if fails needs peg / minimize sedation lab am dc planning        Fernando Cowan  7:28 AM  8/22/2019

## 2019-08-22 NOTE — PROGRESS NOTES
in all extremities     L sided neglect to LT      Coordination:   Unable to test due to lack of cooperation     DTR:     L Babinskis    No pathological reflexes    Laboratory/Radiology:     CBC with Differential:    Lab Results   Component Value Date    WBC 14.5 08/21/2019    RBC 4.32 08/21/2019    HGB 12.3 08/21/2019    HCT 38.9 08/21/2019     08/21/2019    MCV 90.0 08/21/2019    MCH 28.5 08/21/2019    MCHC 31.6 08/21/2019    RDW 15.9 08/21/2019    LYMPHOPCT 7.4 08/19/2019    MONOPCT 2.6 08/19/2019    BASOPCT 0.2 08/19/2019    MONOSABS 0.34 08/19/2019    LYMPHSABS 0.96 08/19/2019    EOSABS 0.02 08/19/2019    BASOSABS 0.03 08/19/2019     CMP:    Lab Results   Component Value Date     08/21/2019    K 4.9 08/21/2019     08/21/2019    CO2 25 08/21/2019    BUN 32 08/21/2019    CREATININE 1.3 08/21/2019    GFRAA >60 08/21/2019    LABGLOM 52 08/21/2019    GLUCOSE 255 08/21/2019    PROT 7.4 08/19/2019    LABALBU 4.1 08/19/2019    CALCIUM 8.5 08/21/2019    BILITOT 0.6 08/19/2019    ALKPHOS 77 08/19/2019    AST 23 08/19/2019    ALT 15 08/19/2019     Lab Results   Component Value Date    LDLCALC 123 (H) 08/22/2019     Lab Results   Component Value Date    LABA1C 9.7 (H) 08/22/2019       CTH  Evolving large right MCA infarct. Atrophy and chronic microvascular  ischemic disease. Carotid US- no significant stenosis. 0-49% bilaterally     I personally reviewed all labs and images today     Assessment:     RMCA ischemic stroke    Carotid US without significant stenosis    Echo pending     Pending swallow evaluation today-- if passed, ASA 81 mg PO can be started. If he fails, will start ASA suppository. Lipitor currently on hold until route of administration is available.  Possible need for PEG pending the above      Palliative following     Plan:     Echo    Pending results, will discuss with family regarding further  investigations for cause of stroke     Pending swallow eval     Risk factor control

## 2019-08-23 ENCOUNTER — ANESTHESIA (OUTPATIENT)
Dept: ENDOSCOPY | Age: 84
DRG: 064 | End: 2019-08-23
Payer: MEDICARE

## 2019-08-23 ENCOUNTER — ANESTHESIA EVENT (OUTPATIENT)
Dept: ENDOSCOPY | Age: 84
DRG: 064 | End: 2019-08-23
Payer: MEDICARE

## 2019-08-23 ENCOUNTER — APPOINTMENT (OUTPATIENT)
Dept: GENERAL RADIOLOGY | Age: 84
DRG: 064 | End: 2019-08-23
Payer: MEDICARE

## 2019-08-23 VITALS — OXYGEN SATURATION: 95 % | SYSTOLIC BLOOD PRESSURE: 153 MMHG | DIASTOLIC BLOOD PRESSURE: 94 MMHG

## 2019-08-23 PROBLEM — E44.0 MODERATE PROTEIN-CALORIE MALNUTRITION (HCC): Status: ACTIVE | Noted: 2019-08-23

## 2019-08-23 PROBLEM — R13.12 OROPHARYNGEAL DYSPHAGIA: Status: ACTIVE | Noted: 2019-08-23

## 2019-08-23 LAB
ANION GAP SERPL CALCULATED.3IONS-SCNC: 18 MMOL/L (ref 7–16)
BUN BLDV-MCNC: 41 MG/DL (ref 8–23)
CALCIUM SERPL-MCNC: 8 MG/DL (ref 8.6–10.2)
CHLORIDE BLD-SCNC: 111 MMOL/L (ref 98–107)
CO2: 18 MMOL/L (ref 22–29)
CREAT SERPL-MCNC: 1.2 MG/DL (ref 0.7–1.2)
GFR AFRICAN AMERICAN: >60
GFR NON-AFRICAN AMERICAN: 57 ML/MIN/1.73
GLUCOSE BLD-MCNC: 226 MG/DL (ref 74–99)
HCT VFR BLD CALC: 41.2 % (ref 37–54)
HEMOGLOBIN: 12.8 G/DL (ref 12.5–16.5)
LV EF: 25 %
LVEF MODALITY: NORMAL
MCH RBC QN AUTO: 28.6 PG (ref 26–35)
MCHC RBC AUTO-ENTMCNC: 31.1 % (ref 32–34.5)
MCV RBC AUTO: 92.2 FL (ref 80–99.9)
METER GLUCOSE: 205 MG/DL (ref 74–99)
METER GLUCOSE: 207 MG/DL (ref 74–99)
METER GLUCOSE: 212 MG/DL (ref 74–99)
METER GLUCOSE: 234 MG/DL (ref 74–99)
PDW BLD-RTO: 16.2 FL (ref 11.5–15)
PLATELET # BLD: 236 E9/L (ref 130–450)
PMV BLD AUTO: 10.8 FL (ref 7–12)
POTASSIUM SERPL-SCNC: 4.5 MMOL/L (ref 3.5–5)
RBC # BLD: 4.47 E12/L (ref 3.8–5.8)
SODIUM BLD-SCNC: 147 MMOL/L (ref 132–146)
WBC # BLD: 12.3 E9/L (ref 4.5–11.5)

## 2019-08-23 PROCEDURE — 2580000003 HC RX 258: Performed by: INTERNAL MEDICINE

## 2019-08-23 PROCEDURE — 3700000000 HC ANESTHESIA ATTENDED CARE: Performed by: SURGERY

## 2019-08-23 PROCEDURE — 6360000002 HC RX W HCPCS: Performed by: NURSE ANESTHETIST, CERTIFIED REGISTERED

## 2019-08-23 PROCEDURE — 94640 AIRWAY INHALATION TREATMENT: CPT

## 2019-08-23 PROCEDURE — 97530 THERAPEUTIC ACTIVITIES: CPT

## 2019-08-23 PROCEDURE — 93306 TTE W/DOPPLER COMPLETE: CPT

## 2019-08-23 PROCEDURE — 6370000000 HC RX 637 (ALT 250 FOR IP): Performed by: INTERNAL MEDICINE

## 2019-08-23 PROCEDURE — 6370000000 HC RX 637 (ALT 250 FOR IP): Performed by: STUDENT IN AN ORGANIZED HEALTH CARE EDUCATION/TRAINING PROGRAM

## 2019-08-23 PROCEDURE — 2060000000 HC ICU INTERMEDIATE R&B

## 2019-08-23 PROCEDURE — 7100000001 HC PACU RECOVERY - ADDTL 15 MIN: Performed by: SURGERY

## 2019-08-23 PROCEDURE — 82962 GLUCOSE BLOOD TEST: CPT

## 2019-08-23 PROCEDURE — 6360000002 HC RX W HCPCS: Performed by: INTERNAL MEDICINE

## 2019-08-23 PROCEDURE — 7100000000 HC PACU RECOVERY - FIRST 15 MIN: Performed by: SURGERY

## 2019-08-23 PROCEDURE — 97535 SELF CARE MNGMENT TRAINING: CPT

## 2019-08-23 PROCEDURE — 3609013300 HC EGD TUBE PLACEMENT: Performed by: SURGERY

## 2019-08-23 PROCEDURE — 2500000003 HC RX 250 WO HCPCS: Performed by: SURGERY

## 2019-08-23 PROCEDURE — 2709999900 HC NON-CHARGEABLE SUPPLY: Performed by: SURGERY

## 2019-08-23 PROCEDURE — 3700000001 HC ADD 15 MINUTES (ANESTHESIA): Performed by: SURGERY

## 2019-08-23 PROCEDURE — 99232 SBSQ HOSP IP/OBS MODERATE 35: CPT | Performed by: CLINICAL NURSE SPECIALIST

## 2019-08-23 PROCEDURE — 80048 BASIC METABOLIC PNL TOTAL CA: CPT

## 2019-08-23 PROCEDURE — 0DH63UZ INSERTION OF FEEDING DEVICE INTO STOMACH, PERCUTANEOUS APPROACH: ICD-10-PCS | Performed by: STUDENT IN AN ORGANIZED HEALTH CARE EDUCATION/TRAINING PROGRAM

## 2019-08-23 PROCEDURE — 36415 COLL VENOUS BLD VENIPUNCTURE: CPT

## 2019-08-23 PROCEDURE — 85027 COMPLETE CBC AUTOMATED: CPT

## 2019-08-23 PROCEDURE — 71045 X-RAY EXAM CHEST 1 VIEW: CPT

## 2019-08-23 PROCEDURE — 2700000000 HC OXYGEN THERAPY PER DAY

## 2019-08-23 PROCEDURE — 0DJ08ZZ INSPECTION OF UPPER INTESTINAL TRACT, VIA NATURAL OR ARTIFICIAL OPENING ENDOSCOPIC: ICD-10-PCS | Performed by: STUDENT IN AN ORGANIZED HEALTH CARE EDUCATION/TRAINING PROGRAM

## 2019-08-23 RX ORDER — LIDOCAINE HYDROCHLORIDE 10 MG/ML
INJECTION, SOLUTION INFILTRATION; PERINEURAL PRN
Status: DISCONTINUED | OUTPATIENT
Start: 2019-08-23 | End: 2019-08-23 | Stop reason: ALTCHOICE

## 2019-08-23 RX ORDER — SUCRALFATE 1 G/1
1 TABLET ORAL EVERY 6 HOURS SCHEDULED
Status: DISCONTINUED | OUTPATIENT
Start: 2019-08-23 | End: 2019-08-30 | Stop reason: HOSPADM

## 2019-08-23 RX ORDER — PROPOFOL 10 MG/ML
INJECTION, EMULSION INTRAVENOUS PRN
Status: DISCONTINUED | OUTPATIENT
Start: 2019-08-23 | End: 2019-08-23 | Stop reason: SDUPTHER

## 2019-08-23 RX ORDER — PANTOPRAZOLE SODIUM 40 MG/10ML
40 INJECTION, POWDER, LYOPHILIZED, FOR SOLUTION INTRAVENOUS
Status: DISCONTINUED | OUTPATIENT
Start: 2019-08-24 | End: 2019-08-28

## 2019-08-23 RX ORDER — 0.9 % SODIUM CHLORIDE 0.9 %
10 VIAL (ML) INJECTION DAILY
Status: DISCONTINUED | OUTPATIENT
Start: 2019-08-23 | End: 2019-08-30 | Stop reason: HOSPADM

## 2019-08-23 RX ORDER — CEFAZOLIN SODIUM 1 G/3ML
INJECTION, POWDER, FOR SOLUTION INTRAMUSCULAR; INTRAVENOUS PRN
Status: DISCONTINUED | OUTPATIENT
Start: 2019-08-23 | End: 2019-08-23 | Stop reason: SDUPTHER

## 2019-08-23 RX ADMIN — METHYLPREDNISOLONE SODIUM SUCCINATE 20 MG: 40 INJECTION, POWDER, FOR SOLUTION INTRAMUSCULAR; INTRAVENOUS at 02:51

## 2019-08-23 RX ADMIN — PROPOFOL 70 MG: 10 INJECTION, EMULSION INTRAVENOUS at 16:14

## 2019-08-23 RX ADMIN — METHYLPREDNISOLONE SODIUM SUCCINATE 20 MG: 40 INJECTION, POWDER, FOR SOLUTION INTRAMUSCULAR; INTRAVENOUS at 14:47

## 2019-08-23 RX ADMIN — ALBUTEROL SULFATE 2.5 MG: 2.5 SOLUTION RESPIRATORY (INHALATION) at 03:36

## 2019-08-23 RX ADMIN — INSULIN LISPRO 2 UNITS: 100 INJECTION, SOLUTION INTRAVENOUS; SUBCUTANEOUS at 19:59

## 2019-08-23 RX ADMIN — SODIUM CHLORIDE: 9 INJECTION, SOLUTION INTRAVENOUS at 00:10

## 2019-08-23 RX ADMIN — INSULIN LISPRO 4 UNITS: 100 INJECTION, SOLUTION INTRAVENOUS; SUBCUTANEOUS at 18:05

## 2019-08-23 RX ADMIN — AMPICILLIN SODIUM AND SULBACTAM SODIUM 3 G: 2; 1 INJECTION, POWDER, FOR SOLUTION INTRAMUSCULAR; INTRAVENOUS at 17:58

## 2019-08-23 RX ADMIN — BUDESONIDE 250 MCG: 0.25 SUSPENSION RESPIRATORY (INHALATION) at 11:18

## 2019-08-23 RX ADMIN — FORMOTEROL FUMARATE DIHYDRATE 20 MCG: 20 SOLUTION RESPIRATORY (INHALATION) at 11:17

## 2019-08-23 RX ADMIN — AMPICILLIN SODIUM AND SULBACTAM SODIUM 3 G: 2; 1 INJECTION, POWDER, FOR SOLUTION INTRAMUSCULAR; INTRAVENOUS at 11:17

## 2019-08-23 RX ADMIN — ALBUTEROL SULFATE 2.5 MG: 2.5 SOLUTION RESPIRATORY (INHALATION) at 11:16

## 2019-08-23 RX ADMIN — CEFAZOLIN 2000 MG: 1 INJECTION, POWDER, FOR SOLUTION INTRAMUSCULAR; INTRAVENOUS; PARENTERAL at 16:14

## 2019-08-23 RX ADMIN — INSULIN LISPRO 4 UNITS: 100 INJECTION, SOLUTION INTRAVENOUS; SUBCUTANEOUS at 11:17

## 2019-08-23 ASSESSMENT — PAIN SCALES - GENERAL
PAINLEVEL_OUTOF10: 0

## 2019-08-23 NOTE — PROGRESS NOTES
Patient for PEG today. Patient was sleeping and left undisturbed. Family is still deciding if they want to go ahead with PEG tube, nursing told to please page [facility] when family decides as patient is already on the schedule for today.      Electronically signed by Sergio Monterroso MD on 8/23/2019 at 6:11 AM

## 2019-08-23 NOTE — PROGRESS NOTES
in supine. Pt transferred to EOB requiring assist for BLE and trunk. Pt sat EOB approx 25 minutes with max/dep A for L lateral and posterior lean. Pt pushing to L with RUE requiring RUE placed on lap to prevent. Pt performed ankle pumps and LAQ AA to BLE. Pt unsafe to attempt STS d/t general debility at this time. Pt returned to supine requiring assist for BLE and trunk and rolled to R and L while TAPS system adjusted. Pt left in chair position with all needs met, family and TSM present. Family requested call light left by night stand and all 4 rails up.        Time in: 1015  Time out: [address] [place] PTA [id number]

## 2019-08-23 NOTE — PROGRESS NOTES
as able  Echo noted - biventricular failure EF 25% with pulmonary hypertension  Unclear how well he will do long term    Electronically signed by Mayra Morales MD on 8/23/2019 at 2:34 PM

## 2019-08-23 NOTE — ANESTHESIA PRE PROCEDURE
(Peripheral IV left hand )  Induction: intravenous. Plan discussed with CRNA and attending.                   Albert Zavala DO   8/23/2019

## 2019-08-23 NOTE — ANESTHESIA POSTPROCEDURE EVALUATION
Department of Anesthesiology  Postprocedure Note    Patient: Lili Forbes  MRN: [id number]  YOB: 1927  Date of evaluation: 8/23/2019  Time:  5:14 PM     Procedure Summary     Date:  08/23/19 Room / Location:  [facility]    Anesthesia Start:  5054 Anesthesia Stop:  8692    Procedure:  EGD PEG TUBE PLACEMENT (N/A ) Diagnosis:  (/)    Surgeon:  Lore Ortega MD Responsible Provider:  Leila Randle DO    Anesthesia Type:  MAC ASA Status:  3          Anesthesia Type: MAC    Tyrell Phase I: Tyrell Score: 7    Tyrell Phase II:      Last vitals: Reviewed and per EMR flowsheets.        Anesthesia Post Evaluation    Patient location during evaluation: bedside  Patient participation: complete - patient cannot participate  Level of consciousness: awake and alert  Airway patency: patent  Nausea & Vomiting: no nausea and no vomiting  Complications: no  Cardiovascular status: blood pressure returned to baseline  Respiratory status: acceptable  Hydration status: euvolemic

## 2019-08-23 NOTE — PROGRESS NOTES
Saira Barreto is a 80 y.o. right handed male     Neurology is following for [facility] stroke     PMH significant for DM, HTN   Uncontrolled DM with a1c 9.7    He presented after being found down on the floor by his wife with left sided weakness and difficulty speaking. NIHSS on arrival was 20. He was out of the tPA window. He also has possible aspiration pneumonia and pulmonary is following    Plan is for VALDEMAR on discharge      Clinically, there have been no changes in his neurological status per staff     R/p CTH shows evolving RMCA stroke with no evidence of shift at this time. No family present this afternoon    PEG today     Objective:     BP (!) 140/77   Pulse 98   Temp 98.9 °F (37.2 °C)   Resp 20   Ht 6' (1.829 m)   Wt 153 lb 6.4 oz (69.6 kg)   SpO2 95%   BMI 20.80 kg/m²     General: In no acute distress. Elderly appearing. Head: Normocephalic, atraumatic. Neck: Supple  Heart: RRR. No murmur  Lungs: Diminished on R. Extremities: No cyanosis or edema  Skin: No rashes or lesions     Mental Status: Eyes closed. Responds to voice. Oriented to self and son.      Speech: Dysarthric   Language: answers questions and follows commands appropriately     Cranial Nerves:  I: smell    II: visual acuity     II: visual fields    II: pupils Difficult to assess because of limited eye opening    III,VII: ptosis    III,IV,VI: extraocular muscles  Eyelid apraxia   V: mastication    V: facial light touch sensation  Normal   V,VII: corneal reflex     VII: facial muscle function - upper  Normal   VII: facial muscle function - lower L central weakness   VIII: hearing Normal   IX: soft palate elevation  Normal    IX,X: gag reflex    XI: trapezius strength     XI: sternocleidomastoid strength    XI: neck extension strength     XII: tongue strength  Midline      Motor:  L hemiplegia   Normal bulk   Flaccid tone     Sensory:  Appreciates PP right side   L neglect       L Babinskis    No pathological

## 2019-08-23 NOTE — CARE COORDINATION
8/23/2019  Met with patient's family to assist with discharge needs. They are receptive to rehab and requested 1. [facility]  2. [facility]  3. [facility] [employer]. Referral has been made to Abhilash Ladd at his time. SW will follow and assist with transition of care.

## 2019-08-23 NOTE — PROGRESS NOTES
Aspiration pneumonia of right lung (HCC)    NSTEMI (non-ST elevated myocardial infarction) ([facility]    Goals of care, counseling/discussion    Palliative care encounter  Resolved Problems:    * No resolved hospital problems. *      Plan:   For peg if family signs consent / cont atbaerosols per pulm / cont pt/ot / dc planning to rehab / start asa once peg inserted        Ann Baker  6:11 AM  8/23/2019

## 2019-08-23 NOTE — PROGRESS NOTES
today       Grooming Maximal Assist   Basic grooming task with R UE  Mod A; Increased assistance noted with date with washing off his face upright in bed with support. Pt did attend to L side    Stand by Assist    UB Dressing Dependent  Max A    Max cues for lili dressing technique     Minimal Assist    LB Dressing Dependent  Dep     Poor sitting balance therefore unable to attempt at this time   Moderate Assist    Bathing Dependent  Dep   Moderate Assist    Toileting Dependent   Dep  Catheter present   Moderate Assist    Bed Mobility  Supine to sit: Dependent x2  Sit to supine: Dependent x2 Sup <> Sit: Dep x 2;          Supine to sit: Moderate Assist   Sit to supine: Moderate Assist    Functional Transfers NT  N/A  Maximal Assist     Functional Mobility NT   N/A     Balance Sitting: Max A (episodes on Mod A)  Posterior / L lateral anita     Standing: NT Sitting: Max/Dep     Positioned at mid-line with L UE supported, focusing on posture & increasing alertness, Dep with posterior & L lateral lean, with R hand placed on his lap to prevent pushing. Tolerated 25 minutes at EOB     Activity Tolerance Poor  Poor  Pt opened his eyes once this date during  treatment, continues to be lethargic  F   Visual/  Perceptual L lateral head rotation / gaze,   Eyes shut majority of session; continue to assess    Unable to assess due to eyes remain closed,  Heavy L sided neglect                                            Comments/Treatment: Upon arrival, patient supine in bed and agreeable to OT Session with PTA collaboration - family present. Therapist facilitated bed mobility (rolling, supine<.sit) and sitting balance at EOB  (L lateral / posterior lean max  Cuing on posture, weightshifting and activity tolerance) - skilled cuing on sequencing, hand placement, posture, body mechanics and safety.   Therapist facilitated L UE PROM and skilled positioning of L UE / LE.  instructed pt on SROM with pt assisting with task with [facility] assist when pt was upright in bed. Therapist facilitated self-care retraining: UB self-care tasks (simulated gown) and grooming task (face wash / haircare) Pt demonstrating poor+ understanding of education/techniques, requiring additional training/education. At end of session, patient semi-supine in bed with pillows under B LE & under L UE for joint protection & edema control, head in good neutral alignment, all lines and tubes intact  Family present & declined to have call light in place at this time. Bed alarm set. Pt would benefit from continued skilled OT to increase functional independence and quality of life. · Pt has made Poor progress towards set goals. · Continue with current plan of care    Time in: 10:15am  Time out: 10:55am  Total Tx Time: 40 minutes    Mitesh Kruse.  [address] [place] [address], [address] [place] [address]

## 2019-08-24 LAB
BLOOD CULTURE, ROUTINE: NORMAL
METER GLUCOSE: 159 MG/DL (ref 74–99)
METER GLUCOSE: 182 MG/DL (ref 74–99)
METER GLUCOSE: 188 MG/DL (ref 74–99)
METER GLUCOSE: 196 MG/DL (ref 74–99)
METER GLUCOSE: 265 MG/DL (ref 74–99)

## 2019-08-24 PROCEDURE — 6360000002 HC RX W HCPCS: Performed by: STUDENT IN AN ORGANIZED HEALTH CARE EDUCATION/TRAINING PROGRAM

## 2019-08-24 PROCEDURE — 94668 MNPJ CHEST WALL SBSQ: CPT

## 2019-08-24 PROCEDURE — 6370000000 HC RX 637 (ALT 250 FOR IP): Performed by: STUDENT IN AN ORGANIZED HEALTH CARE EDUCATION/TRAINING PROGRAM

## 2019-08-24 PROCEDURE — 2060000000 HC ICU INTERMEDIATE R&B

## 2019-08-24 PROCEDURE — 82962 GLUCOSE BLOOD TEST: CPT

## 2019-08-24 PROCEDURE — 2700000000 HC OXYGEN THERAPY PER DAY

## 2019-08-24 PROCEDURE — 94640 AIRWAY INHALATION TREATMENT: CPT

## 2019-08-24 PROCEDURE — C9113 INJ PANTOPRAZOLE SODIUM, VIA: HCPCS | Performed by: STUDENT IN AN ORGANIZED HEALTH CARE EDUCATION/TRAINING PROGRAM

## 2019-08-24 PROCEDURE — 2580000003 HC RX 258: Performed by: STUDENT IN AN ORGANIZED HEALTH CARE EDUCATION/TRAINING PROGRAM

## 2019-08-24 RX ORDER — DEXTROSE MONOHYDRATE 50 MG/ML
100 INJECTION, SOLUTION INTRAVENOUS PRN
Status: DISCONTINUED | OUTPATIENT
Start: 2019-08-24 | End: 2019-08-30 | Stop reason: HOSPADM

## 2019-08-24 RX ORDER — DEXTROSE MONOHYDRATE 25 G/50ML
12.5 INJECTION, SOLUTION INTRAVENOUS PRN
Status: DISCONTINUED | OUTPATIENT
Start: 2019-08-24 | End: 2019-08-30 | Stop reason: HOSPADM

## 2019-08-24 RX ORDER — NICOTINE POLACRILEX 4 MG
15 LOZENGE BUCCAL PRN
Status: DISCONTINUED | OUTPATIENT
Start: 2019-08-24 | End: 2019-08-30 | Stop reason: HOSPADM

## 2019-08-24 RX ADMIN — INSULIN LISPRO 2 UNITS: 100 INJECTION, SOLUTION INTRAVENOUS; SUBCUTANEOUS at 16:25

## 2019-08-24 RX ADMIN — AMPICILLIN SODIUM AND SULBACTAM SODIUM 3 G: 2; 1 INJECTION, POWDER, FOR SOLUTION INTRAMUSCULAR; INTRAVENOUS at 11:29

## 2019-08-24 RX ADMIN — SUCRALFATE 1 G: 1 TABLET ORAL at 00:40

## 2019-08-24 RX ADMIN — SUCRALFATE 1 G: 1 TABLET ORAL at 17:26

## 2019-08-24 RX ADMIN — ALBUTEROL SULFATE 2.5 MG: 2.5 SOLUTION RESPIRATORY (INHALATION) at 09:00

## 2019-08-24 RX ADMIN — BUDESONIDE 250 MCG: 0.25 SUSPENSION RESPIRATORY (INHALATION) at 21:17

## 2019-08-24 RX ADMIN — AMPICILLIN SODIUM AND SULBACTAM SODIUM 3 G: 2; 1 INJECTION, POWDER, FOR SOLUTION INTRAMUSCULAR; INTRAVENOUS at 23:28

## 2019-08-24 RX ADMIN — INSULIN LISPRO 3 UNITS: 100 INJECTION, SOLUTION INTRAVENOUS; SUBCUTANEOUS at 21:48

## 2019-08-24 RX ADMIN — ALBUTEROL SULFATE 2.5 MG: 2.5 SOLUTION RESPIRATORY (INHALATION) at 21:15

## 2019-08-24 RX ADMIN — INSULIN LISPRO 2 UNITS: 100 INJECTION, SOLUTION INTRAVENOUS; SUBCUTANEOUS at 11:29

## 2019-08-24 RX ADMIN — SODIUM CHLORIDE: 9 INJECTION, SOLUTION INTRAVENOUS at 09:41

## 2019-08-24 RX ADMIN — INSULIN LISPRO 2 UNITS: 100 INJECTION, SOLUTION INTRAVENOUS; SUBCUTANEOUS at 08:29

## 2019-08-24 RX ADMIN — SUCRALFATE 1 G: 1 TABLET ORAL at 11:29

## 2019-08-24 RX ADMIN — SUCRALFATE 1 G: 1 TABLET ORAL at 05:18

## 2019-08-24 RX ADMIN — FORMOTEROL FUMARATE DIHYDRATE 20 MCG: 20 SOLUTION RESPIRATORY (INHALATION) at 21:16

## 2019-08-24 RX ADMIN — ALBUTEROL SULFATE 2.5 MG: 2.5 SOLUTION RESPIRATORY (INHALATION) at 05:47

## 2019-08-24 RX ADMIN — ALBUTEROL SULFATE 2.5 MG: 2.5 SOLUTION RESPIRATORY (INHALATION) at 13:31

## 2019-08-24 RX ADMIN — METHYLPREDNISOLONE SODIUM SUCCINATE 20 MG: 40 INJECTION, POWDER, FOR SOLUTION INTRAMUSCULAR; INTRAVENOUS at 04:14

## 2019-08-24 RX ADMIN — BUDESONIDE 250 MCG: 0.25 SUSPENSION RESPIRATORY (INHALATION) at 09:03

## 2019-08-24 RX ADMIN — FORMOTEROL FUMARATE DIHYDRATE 20 MCG: 20 SOLUTION RESPIRATORY (INHALATION) at 09:01

## 2019-08-24 RX ADMIN — SUCRALFATE 1 G: 1 TABLET ORAL at 23:28

## 2019-08-24 RX ADMIN — PANTOPRAZOLE SODIUM 40 MG: 40 INJECTION, POWDER, FOR SOLUTION INTRAVENOUS at 05:18

## 2019-08-24 RX ADMIN — ENOXAPARIN SODIUM 30 MG: 30 INJECTION SUBCUTANEOUS at 08:30

## 2019-08-24 RX ADMIN — AMPICILLIN SODIUM AND SULBACTAM SODIUM 3 G: 2; 1 INJECTION, POWDER, FOR SOLUTION INTRAMUSCULAR; INTRAVENOUS at 00:40

## 2019-08-24 RX ADMIN — METHYLPREDNISOLONE SODIUM SUCCINATE 20 MG: 40 INJECTION, POWDER, FOR SOLUTION INTRAMUSCULAR; INTRAVENOUS at 15:34

## 2019-08-24 RX ADMIN — AMPICILLIN SODIUM AND SULBACTAM SODIUM 3 G: 2; 1 INJECTION, POWDER, FOR SOLUTION INTRAMUSCULAR; INTRAVENOUS at 05:18

## 2019-08-24 RX ADMIN — AMPICILLIN SODIUM AND SULBACTAM SODIUM 3 G: 2; 1 INJECTION, POWDER, FOR SOLUTION INTRAMUSCULAR; INTRAVENOUS at 17:27

## 2019-08-24 ASSESSMENT — PAIN SCALES - GENERAL
PAINLEVEL_OUTOF10: 0

## 2019-08-24 NOTE — PROGRESS NOTES
Chief Complaint:  CVA  Subjective: The patient is alert. No problems overnight. Denies chest pain & SOB . Denies abdominal pain. Tolerating diet. No nausea or vomiting. Son at the bedside    Objective:    Vitals:    19 0800   BP: (!) 163/93   Pulse: 97   Resp: 18   Temp: 98 °F (36.7 °C)   SpO2: 99%     Alert, disoriented, flaccid left side  Heart:  RRR, no murmurs, gallops, or rubs. Lungs:  CTA bilaterally, no wheeze, rales or rhonchi  Abd: bowel sounds present, nontender, nondistended, no masses  Extrem:  No clubbing, cyanosis, or edema  Tele: NSR    Lab Results   Component Value Date     2019    K 4.5 2019     2019    CO2 18 2019    BUN 41 2019    CREATININE 1.2 2019    CALCIUM 8.0 2019      Lab Results   Component Value Date    WBC 12.3 2019    RBC 4.47 2019    HGB 12.8 2019    HCT 41.2 2019    MCV 92.2 2019    MCH 28.6 2019    MCHC 31.1 2019    RDW 16.2 2019     2019    MPV 10.8 2019     PT/INR:    Lab Results   Component Value Date    PROTIME 12.3 2019    INR 1.1 2019     No results for input(s): POCGLU in the last 72 hours. Recent Labs     19  0934 19  0633    147*   K 4.9 4.5    111*   CO2 25 18*   BUN 32* 41*   CREATININE 1.3* 1.2   CALCIUM 8.5* 8.0*   GFRAA >60 >60   LABGLOM 52 57   GLUCOSE 255* 226*       Ct Head Wo Contrast    Result Date: 2019  Patient MRN: [id number] : 1927 Age:  80 years Gender: Male Order Date: 2019 12:45 PM Exam: CT HEAD WO CONTRAST Number of Images: 157 views Indication:   ?stroke  Comparison: None. TECHNIQUE: Region of study: Head.  CT images acquired without intravenous contrast. One or more of these dose optimization techniques were utilized: Automated exposure control; mA and/or kV adjustment per patient size (includes targeted exams where dose is matched to clinical indication); or CT of the chest to better characterize pulmonary parenchyma and could identify a nondisplaced right rib trauma, if further imaging is clinically warranted. Scheduled Meds:   ampicillin-sulbactam  3 g Intravenous Q6H    pantoprazole  40 mg Intravenous QAM AC    And    sodium chloride (PF)  10 mL Intravenous Daily    sucralfate  1 g PEG Tube 4 times per day    methylPREDNISolone  20 mg Intravenous Q12H    formoterol  20 mcg Nebulization BID    budesonide  250 mcg Nebulization BID    albuterol  2.5 mg Nebulization Q6H    sodium chloride flush  10 mL Intravenous 2 times per day    enoxaparin  30 mg Subcutaneous Daily    insulin lispro  0-12 Units Subcutaneous TID WC    insulin lispro  0-6 Units Subcutaneous Nightly    [START ON 8/25/2019] levothyroxine  37.5 mcg Intravenous Daily     Continuous Infusions:   sodium chloride 75 mL/hr at 08/23/19 1853     PRN Meds:. LORazepam, barium sulfate, barium sulfate, perflutren lipid microspheres, sodium chloride (Inhalant), sodium chloride flush, magnesium hydroxide, ondansetron    I/O last 3 completed shifts: In: 1853 [I.V.:1853]  Out: 800 [Urine:800]  No intake/output data recorded.     Intake/Output Summary (Last 24 hours) at 8/24/2019 0457  Last data filed at 8/24/2019 7383  Gross per 24 hour   Intake 1853 ml   Output 800 ml   Net 1053 ml       Assessment:    Active Hospital Problems    Diagnosis    Oropharyngeal dysphagia [R13.12]    Moderate protein-calorie malnutrition (HCC) [E44.0]    Aspiration pneumonia of right lung ([facility] [J69.0]    Goals of care, counseling/discussion [Z71.89]    Palliative care encounter [Z51.5]    Acute ischemic right MCA stroke ([facility] [I63.511]    Acute respiratory failure with hypoxia ([facility] [facility] [J96.01]    Type 2 diabetes mellitus, with long-term current use of insulin (HCC) [E11.9, Z79.4]    Hypertension [I10]    Hyperlipidemia [E78.5]       Plan:    Peg placed yesterday              Tube feeds started today

## 2019-08-25 LAB
CULTURE, BLOOD 2: NORMAL
METER GLUCOSE: 292 MG/DL (ref 74–99)
METER GLUCOSE: 306 MG/DL (ref 74–99)
METER GLUCOSE: 340 MG/DL (ref 74–99)
METER GLUCOSE: 371 MG/DL (ref 74–99)

## 2019-08-25 PROCEDURE — 2500000003 HC RX 250 WO HCPCS: Performed by: STUDENT IN AN ORGANIZED HEALTH CARE EDUCATION/TRAINING PROGRAM

## 2019-08-25 PROCEDURE — 2700000000 HC OXYGEN THERAPY PER DAY

## 2019-08-25 PROCEDURE — 6360000002 HC RX W HCPCS: Performed by: STUDENT IN AN ORGANIZED HEALTH CARE EDUCATION/TRAINING PROGRAM

## 2019-08-25 PROCEDURE — 94640 AIRWAY INHALATION TREATMENT: CPT

## 2019-08-25 PROCEDURE — 2060000000 HC ICU INTERMEDIATE R&B

## 2019-08-25 PROCEDURE — 6370000000 HC RX 637 (ALT 250 FOR IP): Performed by: STUDENT IN AN ORGANIZED HEALTH CARE EDUCATION/TRAINING PROGRAM

## 2019-08-25 PROCEDURE — 82962 GLUCOSE BLOOD TEST: CPT

## 2019-08-25 PROCEDURE — C9113 INJ PANTOPRAZOLE SODIUM, VIA: HCPCS | Performed by: STUDENT IN AN ORGANIZED HEALTH CARE EDUCATION/TRAINING PROGRAM

## 2019-08-25 PROCEDURE — 94668 MNPJ CHEST WALL SBSQ: CPT

## 2019-08-25 PROCEDURE — 2580000003 HC RX 258: Performed by: STUDENT IN AN ORGANIZED HEALTH CARE EDUCATION/TRAINING PROGRAM

## 2019-08-25 PROCEDURE — 6370000000 HC RX 637 (ALT 250 FOR IP): Performed by: INTERNAL MEDICINE

## 2019-08-25 RX ADMIN — AMPICILLIN SODIUM AND SULBACTAM SODIUM 3 G: 2; 1 INJECTION, POWDER, FOR SOLUTION INTRAMUSCULAR; INTRAVENOUS at 23:38

## 2019-08-25 RX ADMIN — Medication 10 ML: at 20:56

## 2019-08-25 RX ADMIN — SUCRALFATE 1 G: 1 TABLET ORAL at 23:38

## 2019-08-25 RX ADMIN — LEVOTHYROXINE SODIUM ANHYDROUS 37.5 MCG: 100 INJECTION, POWDER, LYOPHILIZED, FOR SOLUTION INTRAVENOUS at 08:24

## 2019-08-25 RX ADMIN — PANTOPRAZOLE SODIUM 40 MG: 40 INJECTION, POWDER, FOR SOLUTION INTRAVENOUS at 05:20

## 2019-08-25 RX ADMIN — METHYLPREDNISOLONE SODIUM SUCCINATE 20 MG: 40 INJECTION, POWDER, FOR SOLUTION INTRAMUSCULAR; INTRAVENOUS at 15:55

## 2019-08-25 RX ADMIN — BUDESONIDE 250 MCG: 0.25 SUSPENSION RESPIRATORY (INHALATION) at 09:44

## 2019-08-25 RX ADMIN — SUCRALFATE 1 G: 1 TABLET ORAL at 17:32

## 2019-08-25 RX ADMIN — INSULIN LISPRO 6 UNITS: 100 INJECTION, SOLUTION INTRAVENOUS; SUBCUTANEOUS at 23:38

## 2019-08-25 RX ADMIN — ENOXAPARIN SODIUM 30 MG: 30 INJECTION SUBCUTANEOUS at 08:24

## 2019-08-25 RX ADMIN — AMPICILLIN SODIUM AND SULBACTAM SODIUM 3 G: 2; 1 INJECTION, POWDER, FOR SOLUTION INTRAMUSCULAR; INTRAVENOUS at 10:58

## 2019-08-25 RX ADMIN — ALBUTEROL SULFATE 2.5 MG: 2.5 SOLUTION RESPIRATORY (INHALATION) at 09:44

## 2019-08-25 RX ADMIN — ALBUTEROL SULFATE 2.5 MG: 2.5 SOLUTION RESPIRATORY (INHALATION) at 03:12

## 2019-08-25 RX ADMIN — SODIUM CHLORIDE: 9 INJECTION, SOLUTION INTRAVENOUS at 01:48

## 2019-08-25 RX ADMIN — BUDESONIDE 250 MCG: 0.25 SUSPENSION RESPIRATORY (INHALATION) at 20:40

## 2019-08-25 RX ADMIN — INSULIN LISPRO 8 UNITS: 100 INJECTION, SOLUTION INTRAVENOUS; SUBCUTANEOUS at 05:31

## 2019-08-25 RX ADMIN — METHYLPREDNISOLONE SODIUM SUCCINATE 20 MG: 40 INJECTION, POWDER, FOR SOLUTION INTRAMUSCULAR; INTRAVENOUS at 03:37

## 2019-08-25 RX ADMIN — AMPICILLIN SODIUM AND SULBACTAM SODIUM 3 G: 2; 1 INJECTION, POWDER, FOR SOLUTION INTRAMUSCULAR; INTRAVENOUS at 05:20

## 2019-08-25 RX ADMIN — ALBUTEROL SULFATE 2.5 MG: 2.5 SOLUTION RESPIRATORY (INHALATION) at 14:03

## 2019-08-25 RX ADMIN — INSULIN LISPRO 10 UNITS: 100 INJECTION, SOLUTION INTRAVENOUS; SUBCUTANEOUS at 12:01

## 2019-08-25 RX ADMIN — INSULIN LISPRO 8 UNITS: 100 INJECTION, SOLUTION INTRAVENOUS; SUBCUTANEOUS at 18:16

## 2019-08-25 RX ADMIN — FORMOTEROL FUMARATE DIHYDRATE 20 MCG: 20 SOLUTION RESPIRATORY (INHALATION) at 09:44

## 2019-08-25 RX ADMIN — ALBUTEROL SULFATE 2.5 MG: 2.5 SOLUTION RESPIRATORY (INHALATION) at 20:38

## 2019-08-25 RX ADMIN — AMPICILLIN SODIUM AND SULBACTAM SODIUM 3 G: 2; 1 INJECTION, POWDER, FOR SOLUTION INTRAMUSCULAR; INTRAVENOUS at 17:32

## 2019-08-25 RX ADMIN — SUCRALFATE 1 G: 1 TABLET ORAL at 05:20

## 2019-08-25 RX ADMIN — SUCRALFATE 1 G: 1 TABLET ORAL at 12:01

## 2019-08-25 RX ADMIN — FORMOTEROL FUMARATE DIHYDRATE 20 MCG: 20 SOLUTION RESPIRATORY (INHALATION) at 20:39

## 2019-08-25 ASSESSMENT — PAIN SCALES - GENERAL: PAINLEVEL_OUTOF10: 0

## 2019-08-25 NOTE — PROGRESS NOTES
Chief Complaint:  CVA  Subjective: The patient is alert. No problems overnight. Denies chest pain & SOB . Denies abdominal pain. Tolerating diet. No nausea or vomiting. Son at the bedside    Objective:    Vitals:    19 0946   BP:    Pulse:    Resp: 20   Temp:    SpO2: 99%     Alert, disoriented, flaccid left side  Heart:  RRR, no murmurs, gallops, or rubs. Lungs:  CTA bilaterally, no wheeze, rales or rhonchi  Abd: bowel sounds present, nontender, nondistended, no masses, peg tube  Extrem:  No clubbing, cyanosis, or edema  Tele: NSR    Lab Results   Component Value Date     2019    K 4.5 2019     2019    CO2 18 2019    BUN 41 2019    CREATININE 1.2 2019    CALCIUM 8.0 2019      Lab Results   Component Value Date    WBC 12.3 2019    RBC 4.47 2019    HGB 12.8 2019    HCT 41.2 2019    MCV 92.2 2019    MCH 28.6 2019    MCHC 31.1 2019    RDW 16.2 2019     2019    MPV 10.8 2019     PT/INR:    Lab Results   Component Value Date    PROTIME 12.3 2019    INR 1.1 2019     No results for input(s): POCGLU in the last 72 hours. Recent Labs     19  0633   *   K 4.5   *   CO2 18*   BUN 41*   CREATININE 1.2   CALCIUM 8.0*   GFRAA >60   LABGLOM 57   GLUCOSE 226*       Ct Head Wo Contrast    Result Date: 2019  Patient MRN: [id number] : 1927 Age:  80 years Gender: Male Order Date: 2019 12:45 PM Exam: CT HEAD WO CONTRAST Number of Images: 157 views Indication:   ?stroke  Comparison: None. TECHNIQUE: Region of study: Head. CT images acquired without intravenous contrast. One or more of these dose optimization techniques were utilized: Automated exposure control; mA and/or kV adjustment per patient size (includes targeted exams where dose is matched to clinical indication); or iterative reconstruction.  FINDINGS: There is subtle diminished attenuation in the nondisplaced right rib trauma, if further imaging is clinically warranted. Scheduled Meds:   ampicillin-sulbactam  3 g Intravenous Q6H    pantoprazole  40 mg Intravenous QAM AC    And    sodium chloride (PF)  10 mL Intravenous Daily    sucralfate  1 g PEG Tube 4 times per day    methylPREDNISolone  20 mg Intravenous Q12H    formoterol  20 mcg Nebulization BID    budesonide  250 mcg Nebulization BID    albuterol  2.5 mg Nebulization Q6H    sodium chloride flush  10 mL Intravenous 2 times per day    enoxaparin  30 mg Subcutaneous Daily    insulin lispro  0-12 Units Subcutaneous TID WC    insulin lispro  0-6 Units Subcutaneous Nightly    levothyroxine  37.5 mcg Intravenous Daily     Continuous Infusions:   dextrose      sodium chloride 75 mL/hr at 08/25/19 0148     PRN Meds:.glucose, dextrose, glucagon (rDNA), dextrose, LORazepam, barium sulfate, barium sulfate, perflutren lipid microspheres, sodium chloride (Inhalant), sodium chloride flush, magnesium hydroxide, ondansetron    I/O last 3 completed shifts: In: 3427 [I.V.:1795;  NG/GT:732; IV Piggyback:200]  Out: 700 [Urine:700]  I/O this shift:  In: -   Out: 200 [Urine:200]    Intake/Output Summary (Last 24 hours) at 8/25/2019 1003  Last data filed at 8/25/2019 0702  Gross per 24 hour   Intake 2727 ml   Output 900 ml   Net 1827 ml       Assessment:    Active Hospital Problems    Diagnosis    Oropharyngeal dysphagia [R13.12]    Moderate protein-calorie malnutrition ([address] [E44.0]    Aspiration pneumonia of right lung ([address] [J69.0]    Goals of care, counseling/discussion [Z71.89]    Palliative care encounter [Z51.5]    Acute ischemic right MCA stroke ([facility] [address] [I63.511]    Acute respiratory failure with hypoxia ([address] [J96.01]    Type 2 diabetes mellitus, with long-term current use of insulin (HCC) [E11.9, Z79.4]    Hypertension [I10]    Hyperlipidemia [E78.5]       Plan:   Intermittent low grade temp               Peg placed yesterday

## 2019-08-26 ENCOUNTER — APPOINTMENT (OUTPATIENT)
Dept: GENERAL RADIOLOGY | Age: 84
DRG: 064 | End: 2019-08-26
Payer: MEDICARE

## 2019-08-26 LAB
METER GLUCOSE: 233 MG/DL (ref 74–99)
METER GLUCOSE: 252 MG/DL (ref 74–99)
METER GLUCOSE: 324 MG/DL (ref 74–99)
METER GLUCOSE: 361 MG/DL (ref 74–99)

## 2019-08-26 PROCEDURE — 2580000003 HC RX 258: Performed by: STUDENT IN AN ORGANIZED HEALTH CARE EDUCATION/TRAINING PROGRAM

## 2019-08-26 PROCEDURE — 2580000003 HC RX 258: Performed by: NURSE PRACTITIONER

## 2019-08-26 PROCEDURE — 2700000000 HC OXYGEN THERAPY PER DAY

## 2019-08-26 PROCEDURE — C9113 INJ PANTOPRAZOLE SODIUM, VIA: HCPCS | Performed by: STUDENT IN AN ORGANIZED HEALTH CARE EDUCATION/TRAINING PROGRAM

## 2019-08-26 PROCEDURE — 82962 GLUCOSE BLOOD TEST: CPT

## 2019-08-26 PROCEDURE — 97530 THERAPEUTIC ACTIVITIES: CPT

## 2019-08-26 PROCEDURE — 2060000000 HC ICU INTERMEDIATE R&B

## 2019-08-26 PROCEDURE — 71045 X-RAY EXAM CHEST 1 VIEW: CPT

## 2019-08-26 PROCEDURE — 6360000002 HC RX W HCPCS: Performed by: STUDENT IN AN ORGANIZED HEALTH CARE EDUCATION/TRAINING PROGRAM

## 2019-08-26 PROCEDURE — 6370000000 HC RX 637 (ALT 250 FOR IP): Performed by: INTERNAL MEDICINE

## 2019-08-26 PROCEDURE — 6370000000 HC RX 637 (ALT 250 FOR IP): Performed by: STUDENT IN AN ORGANIZED HEALTH CARE EDUCATION/TRAINING PROGRAM

## 2019-08-26 PROCEDURE — 6360000002 HC RX W HCPCS: Performed by: INTERNAL MEDICINE

## 2019-08-26 PROCEDURE — 97535 SELF CARE MNGMENT TRAINING: CPT

## 2019-08-26 PROCEDURE — 94640 AIRWAY INHALATION TREATMENT: CPT

## 2019-08-26 PROCEDURE — 6360000002 HC RX W HCPCS: Performed by: NURSE PRACTITIONER

## 2019-08-26 PROCEDURE — 2500000003 HC RX 250 WO HCPCS: Performed by: STUDENT IN AN ORGANIZED HEALTH CARE EDUCATION/TRAINING PROGRAM

## 2019-08-26 RX ORDER — FUROSEMIDE 10 MG/ML
40 INJECTION INTRAMUSCULAR; INTRAVENOUS 2 TIMES DAILY
Status: DISCONTINUED | OUTPATIENT
Start: 2019-08-26 | End: 2019-08-27

## 2019-08-26 RX ORDER — LEVOTHYROXINE SODIUM 0.07 MG/1
75 TABLET ORAL DAILY
Status: DISCONTINUED | OUTPATIENT
Start: 2019-08-27 | End: 2019-08-28

## 2019-08-26 RX ADMIN — FORMOTEROL FUMARATE DIHYDRATE 20 MCG: 20 SOLUTION RESPIRATORY (INHALATION) at 21:23

## 2019-08-26 RX ADMIN — METHYLPREDNISOLONE SODIUM SUCCINATE 20 MG: 40 INJECTION, POWDER, FOR SOLUTION INTRAMUSCULAR; INTRAVENOUS at 14:20

## 2019-08-26 RX ADMIN — Medication 10 ML: at 08:41

## 2019-08-26 RX ADMIN — SUCRALFATE 1 G: 1 TABLET ORAL at 11:35

## 2019-08-26 RX ADMIN — INSULIN LISPRO 6 UNITS: 100 INJECTION, SOLUTION INTRAVENOUS; SUBCUTANEOUS at 05:22

## 2019-08-26 RX ADMIN — INSULIN LISPRO 8 UNITS: 100 INJECTION, SOLUTION INTRAVENOUS; SUBCUTANEOUS at 17:25

## 2019-08-26 RX ADMIN — AMPICILLIN SODIUM AND SULBACTAM SODIUM 3 G: 2; 1 INJECTION, POWDER, FOR SOLUTION INTRAMUSCULAR; INTRAVENOUS at 04:29

## 2019-08-26 RX ADMIN — METHYLPREDNISOLONE SODIUM SUCCINATE 20 MG: 40 INJECTION, POWDER, FOR SOLUTION INTRAMUSCULAR; INTRAVENOUS at 04:29

## 2019-08-26 RX ADMIN — AMPICILLIN SODIUM AND SULBACTAM SODIUM 3 G: 2; 1 INJECTION, POWDER, FOR SOLUTION INTRAMUSCULAR; INTRAVENOUS at 17:25

## 2019-08-26 RX ADMIN — ALBUTEROL SULFATE 2.5 MG: 2.5 SOLUTION RESPIRATORY (INHALATION) at 08:01

## 2019-08-26 RX ADMIN — BUDESONIDE 250 MCG: 0.25 SUSPENSION RESPIRATORY (INHALATION) at 21:24

## 2019-08-26 RX ADMIN — INSULIN LISPRO 10 UNITS: 100 INJECTION, SOLUTION INTRAVENOUS; SUBCUTANEOUS at 23:44

## 2019-08-26 RX ADMIN — PANTOPRAZOLE SODIUM 40 MG: 40 INJECTION, POWDER, FOR SOLUTION INTRAVENOUS at 05:52

## 2019-08-26 RX ADMIN — FORMOTEROL FUMARATE DIHYDRATE 20 MCG: 20 SOLUTION RESPIRATORY (INHALATION) at 08:02

## 2019-08-26 RX ADMIN — AMPICILLIN SODIUM AND SULBACTAM SODIUM 3 G: 2; 1 INJECTION, POWDER, FOR SOLUTION INTRAMUSCULAR; INTRAVENOUS at 11:36

## 2019-08-26 RX ADMIN — SUCRALFATE 1 G: 1 TABLET ORAL at 17:25

## 2019-08-26 RX ADMIN — ENOXAPARIN SODIUM 30 MG: 30 INJECTION SUBCUTANEOUS at 08:41

## 2019-08-26 RX ADMIN — FUROSEMIDE 40 MG: 10 INJECTION, SOLUTION INTRAMUSCULAR; INTRAVENOUS at 10:41

## 2019-08-26 RX ADMIN — AMPICILLIN SODIUM AND SULBACTAM SODIUM 3 G: 2; 1 INJECTION, POWDER, FOR SOLUTION INTRAMUSCULAR; INTRAVENOUS at 22:59

## 2019-08-26 RX ADMIN — BUDESONIDE 250 MCG: 0.25 SUSPENSION RESPIRATORY (INHALATION) at 08:02

## 2019-08-26 RX ADMIN — ALBUTEROL SULFATE 2.5 MG: 2.5 SOLUTION RESPIRATORY (INHALATION) at 13:09

## 2019-08-26 RX ADMIN — ALBUTEROL SULFATE 2.5 MG: 2.5 SOLUTION RESPIRATORY (INHALATION) at 21:22

## 2019-08-26 RX ADMIN — ALBUTEROL SULFATE 2.5 MG: 2.5 SOLUTION RESPIRATORY (INHALATION) at 02:54

## 2019-08-26 RX ADMIN — SUCRALFATE 1 G: 1 TABLET ORAL at 23:36

## 2019-08-26 RX ADMIN — FUROSEMIDE 40 MG: 10 INJECTION, SOLUTION INTRAMUSCULAR; INTRAVENOUS at 17:26

## 2019-08-26 RX ADMIN — LEVOTHYROXINE SODIUM ANHYDROUS 37.5 MCG: 100 INJECTION, POWDER, LYOPHILIZED, FOR SOLUTION INTRAVENOUS at 08:41

## 2019-08-26 RX ADMIN — Medication 10 ML: at 22:59

## 2019-08-26 RX ADMIN — INSULIN LISPRO 4 UNITS: 100 INJECTION, SOLUTION INTRAVENOUS; SUBCUTANEOUS at 11:35

## 2019-08-26 RX ADMIN — SUCRALFATE 1 G: 1 TABLET ORAL at 05:22

## 2019-08-26 ASSESSMENT — PAIN SCALES - GENERAL
PAINLEVEL_OUTOF10: 0

## 2019-08-26 NOTE — PROGRESS NOTES
Physical Therapy      Name: Eddie Shaikh  : 1927  MRN: [id number]    Date of Service: 2019    Evaluating PT: Claudette Hare, PT, DPT [id number]    Room #:  2649/9766-S    Diagnosis: Acute ischemic right MCA stroke  Precautions: Fall risk, L hemiparesis, aphasia, L lateral gaze, O2, brasher, TAPS, TSM, alarm, PEG tube    Pt lives with wife in a single story house with 3 stair(s) and 1 rail(s) to enter. Bed is on the first floor and bath is on the first floor. Pt ambulated without AD Independent prior to admission. Initial Evaluation  Date: 19 Treatment Date:19 Short Term/ Long Term   Goals   AM-PAC 6 Clicks     Was pt agreeable to Eval/treatment? Yes yes    Does pt have pain? No current complaints/indications of pain No c/o    Bed Mobility  Rolling: NT  Supine to sit: Dependent x2  Sit to supine: Dependent x2  Scooting: Dependent toward EOB Rolling: Dep A  Supine to sit: Dependent x2  Sit to supine: Dependent x2  Scooting: Dependent  Rolling: Max A  Supine to sit: Max A  Sit to supine: Max A   Scooting: Max A   Transfers Sit to stand: NT  Stand to sit: NT  Stand pivot: NT N/T Sit to stand: Max A  Stand to sit: Max A  Stand pivot: Max A with AAD   Ambulation   NT N/T Sidestep 3 feet to L and R with AAD with Max A   Stair negotiation: NT NT NA   ROM B UE: Refer to OT note  B LE: WFL     Strength B UE: Refer to OT note  B LE: L 0/5, R 4/5 grossly (unable to follow commands consistently)     Balance Sitting EOB: Max A with Mod A intermittently  Dynamic standing: NT Sitting EOB Max/Dep A Sitting EOB: SBA  Dynamic standing: Max A with AAD     Patient education  Pt educated on seated posture/balance    Patient response to education:   Pt verbalized understanding Pt demonstrated skill Pt requires further education in this area   No Partial  Yes     Comments:  Pt supine in bed upon entering room. Pt inconsistent with responses to questions with increased lethargy noted this date.  Pt transferred

## 2019-08-26 NOTE — CARE COORDINATION
HIEU spoke with Ernesto Pettit at [facility], referral made for [facility]. She will review, if unable to accept, 3rd choice is [facility].

## 2019-08-26 NOTE — PROGRESS NOTES
characterize pulmonary parenchyma and could identify a nondisplaced right rib trauma, if further imaging is clinically warranted. Scheduled Meds:   furosemide  40 mg Intravenous BID    levothyroxine  75 mcg Oral Daily    insulin lispro  0-12 Units Subcutaneous Q6H    ampicillin-sulbactam  3 g Intravenous Q6H    pantoprazole  40 mg Intravenous QAM AC    And    sodium chloride (PF)  10 mL Intravenous Daily    sucralfate  1 g PEG Tube 4 times per day    methylPREDNISolone  20 mg Intravenous Q12H    formoterol  20 mcg Nebulization BID    budesonide  250 mcg Nebulization BID    albuterol  2.5 mg Nebulization Q6H    sodium chloride flush  10 mL Intravenous 2 times per day    enoxaparin  30 mg Subcutaneous Daily     Continuous Infusions:   dextrose       PRN Meds:.glucose, dextrose, glucagon (rDNA), dextrose, LORazepam, barium sulfate, barium sulfate, perflutren lipid microspheres, sodium chloride (Inhalant), sodium chloride flush, magnesium hydroxide, ondansetron    I/O last 3 completed shifts: In: 994 [NG/GT:794; IV Piggyback:200]  Out: 950 [Urine:950]  No intake/output data recorded.     Intake/Output Summary (Last 24 hours) at 8/26/2019 0945  Last data filed at 8/26/2019 0542  Gross per 24 hour   Intake 994 ml   Output 750 ml   Net 244 ml       Assessment:    Active Hospital Problems    Diagnosis    Oropharyngeal dysphagia [R13.12]    Moderate protein-calorie malnutrition ([address] [E44.0]    Aspiration pneumonia of right lung ([facility] [J69.0]    Goals of care, counseling/discussion [Z71.89]    Palliative care encounter [Z51.5]    Acute ischemic right MCA stroke ([facility] [address] [I63.511]    Acute respiratory failure with hypoxia ([facility] [address] [J96.01]    Type 2 diabetes mellitus, with long-term current use of insulin (HCC) [E11.9, Z79.4]    Hypertension [I10]    Hyperlipidemia [E78.5]       Plan:   Intermittent low grade temp               Peg placed                Tube feeds started and tolerated well Await discharge planning to [facility]               Lab and meds reviewed--continue tx               CXR reviewed               Diurese today               Free water added               BS better controlled               Check CBC in AM and procalcitonin               Change thyroid to PEG               Check BMP                      Colleen Lockwood DO  9:45 AM  8/26/2019

## 2019-08-26 NOTE — PROGRESS NOTES
Occupational Therapy  OT BEDSIDE TREATMENT NOTE      Date:2019  Patient Name: Harpreet Thrasher  MRN: [id number]  : 1927  Room: [address] [place] [address]     Modified [place] Scale (MRS)  Score     Description  0             No symptoms  1             No significant disability despite symptoms  2             Slight disability; able to look after own affairs  3             Moderate disability; able to ambulate without assist/ requires assist with ADLs  4             Moderate/Severe disability;requires assist to ambulate/assist with ADLs  5             Severe disability;bedridden/incontinent   6               Score:   5     Evaluating OT: Zain Kwan OTR/L #[id number]      AM-PAC Daily Activity Raw Score:      Recommended Adaptive Equipment:  TBD      Diagnosis: Acute Ischemic stroke                       R MCA stroke   Patient presented to ED for AMS, L facial droop, L sided weakness     Precautions:  Falls, L hemiparesis, aphasia, dysarthria, L lateral head rotation/gaze, O2, TAPS, alarm, peg tube      Home Living: Obtained from son: Pt lives with wife in a 1 story home with 3 SHIVA and 1 hand rail. Bathroom setup: tub/shower combo;  Walk-in shower in basement   Equipment owned: shower chair     Prior Level of Function: Independent with ADLs , Independent with IADLs; ambulated without AD  Driving: yes  Occupation: retired      Pain Level: Pt denies pain this session    Cognition: A&O: 2/4 (self and place) Pt is able to follow 1 step commands with max cues verbal & tactile, pt answers questions appropriately, but continues to have slurred & garbled speech.   Pt lethargic through out treatment              Memory:  poor              Sequencing:  poor              Problem solving:  poor              Judgement/safety:  poor                Functional Assessment:    Initial Eval Status  Date: 19 Treatment Status  Date: 19 Short Term Goals  Treatment frequency: PRN   Feeding NPO  NPO  Peg Tube       Grooming Maximal

## 2019-08-27 ENCOUNTER — APPOINTMENT (OUTPATIENT)
Dept: GENERAL RADIOLOGY | Age: 84
DRG: 064 | End: 2019-08-27
Payer: MEDICARE

## 2019-08-27 LAB
ACANTHOCYTES: ABNORMAL
ANION GAP SERPL CALCULATED.3IONS-SCNC: 9 MMOL/L (ref 7–16)
ANISOCYTOSIS: ABNORMAL
BASOPHILS ABSOLUTE: 0.01 E9/L (ref 0–0.2)
BASOPHILS RELATIVE PERCENT: 0.1 % (ref 0–2)
BUN BLDV-MCNC: 69 MG/DL (ref 8–23)
CALCIUM SERPL-MCNC: 8.5 MG/DL (ref 8.6–10.2)
CHLORIDE BLD-SCNC: 117 MMOL/L (ref 98–107)
CO2: 27 MMOL/L (ref 22–29)
CREAT SERPL-MCNC: 1.4 MG/DL (ref 0.7–1.2)
EOSINOPHILS ABSOLUTE: 0 E9/L (ref 0.05–0.5)
EOSINOPHILS RELATIVE PERCENT: 0 % (ref 0–6)
GFR AFRICAN AMERICAN: 57
GFR NON-AFRICAN AMERICAN: 47 ML/MIN/1.73
GLUCOSE BLD-MCNC: 300 MG/DL (ref 74–99)
HCT VFR BLD CALC: 43.2 % (ref 37–54)
HEMOGLOBIN: 13 G/DL (ref 12.5–16.5)
IMMATURE GRANULOCYTES #: 0.12 E9/L
IMMATURE GRANULOCYTES %: 0.9 % (ref 0–5)
LYMPHOCYTES ABSOLUTE: 0.5 E9/L (ref 1.5–4)
LYMPHOCYTES RELATIVE PERCENT: 3.9 % (ref 20–42)
MCH RBC QN AUTO: 28 PG (ref 26–35)
MCHC RBC AUTO-ENTMCNC: 30.1 % (ref 32–34.5)
MCV RBC AUTO: 92.9 FL (ref 80–99.9)
METER GLUCOSE: 170 MG/DL (ref 74–99)
METER GLUCOSE: 190 MG/DL (ref 74–99)
METER GLUCOSE: 317 MG/DL (ref 74–99)
MONOCYTES ABSOLUTE: 0.27 E9/L (ref 0.1–0.95)
MONOCYTES RELATIVE PERCENT: 2.1 % (ref 2–12)
NEUTROPHILS ABSOLUTE: 12.02 E9/L (ref 1.8–7.3)
NEUTROPHILS RELATIVE PERCENT: 93 % (ref 43–80)
OVALOCYTES: ABNORMAL
PDW BLD-RTO: 16.7 FL (ref 11.5–15)
PLATELET # BLD: 161 E9/L (ref 130–450)
PMV BLD AUTO: 11.5 FL (ref 7–12)
POIKILOCYTES: ABNORMAL
POTASSIUM SERPL-SCNC: 4.9 MMOL/L (ref 3.5–5)
PRO-BNP: ABNORMAL PG/ML (ref 0–450)
PROCALCITONIN: 0.11 NG/ML (ref 0–0.08)
RBC # BLD: 4.65 E12/L (ref 3.8–5.8)
SCHISTOCYTES: ABNORMAL
SODIUM BLD-SCNC: 153 MMOL/L (ref 132–146)
WBC # BLD: 12.9 E9/L (ref 4.5–11.5)

## 2019-08-27 PROCEDURE — 85025 COMPLETE CBC W/AUTO DIFF WBC: CPT

## 2019-08-27 PROCEDURE — 82962 GLUCOSE BLOOD TEST: CPT

## 2019-08-27 PROCEDURE — 6360000002 HC RX W HCPCS: Performed by: NURSE PRACTITIONER

## 2019-08-27 PROCEDURE — 2700000000 HC OXYGEN THERAPY PER DAY

## 2019-08-27 PROCEDURE — 6360000002 HC RX W HCPCS: Performed by: INTERNAL MEDICINE

## 2019-08-27 PROCEDURE — 94669 MECHANICAL CHEST WALL OSCILL: CPT

## 2019-08-27 PROCEDURE — 6370000000 HC RX 637 (ALT 250 FOR IP): Performed by: NURSE PRACTITIONER

## 2019-08-27 PROCEDURE — 6360000002 HC RX W HCPCS: Performed by: STUDENT IN AN ORGANIZED HEALTH CARE EDUCATION/TRAINING PROGRAM

## 2019-08-27 PROCEDURE — 6370000000 HC RX 637 (ALT 250 FOR IP): Performed by: INTERNAL MEDICINE

## 2019-08-27 PROCEDURE — 80048 BASIC METABOLIC PNL TOTAL CA: CPT

## 2019-08-27 PROCEDURE — 2580000003 HC RX 258: Performed by: NURSE PRACTITIONER

## 2019-08-27 PROCEDURE — 94660 CPAP INITIATION&MGMT: CPT

## 2019-08-27 PROCEDURE — 71045 X-RAY EXAM CHEST 1 VIEW: CPT

## 2019-08-27 PROCEDURE — 2580000003 HC RX 258: Performed by: STUDENT IN AN ORGANIZED HEALTH CARE EDUCATION/TRAINING PROGRAM

## 2019-08-27 PROCEDURE — 83880 ASSAY OF NATRIURETIC PEPTIDE: CPT

## 2019-08-27 PROCEDURE — 84145 PROCALCITONIN (PCT): CPT

## 2019-08-27 PROCEDURE — 6370000000 HC RX 637 (ALT 250 FOR IP): Performed by: STUDENT IN AN ORGANIZED HEALTH CARE EDUCATION/TRAINING PROGRAM

## 2019-08-27 PROCEDURE — C9113 INJ PANTOPRAZOLE SODIUM, VIA: HCPCS | Performed by: STUDENT IN AN ORGANIZED HEALTH CARE EDUCATION/TRAINING PROGRAM

## 2019-08-27 PROCEDURE — 2060000000 HC ICU INTERMEDIATE R&B

## 2019-08-27 PROCEDURE — 36415 COLL VENOUS BLD VENIPUNCTURE: CPT

## 2019-08-27 PROCEDURE — 94640 AIRWAY INHALATION TREATMENT: CPT

## 2019-08-27 RX ORDER — ATORVASTATIN CALCIUM 10 MG/1
20 TABLET, FILM COATED ORAL NIGHTLY
Status: DISCONTINUED | OUTPATIENT
Start: 2019-08-27 | End: 2019-08-28

## 2019-08-27 RX ORDER — PREDNISONE 20 MG/1
20 TABLET ORAL DAILY
Status: DISCONTINUED | OUTPATIENT
Start: 2019-08-27 | End: 2019-08-30

## 2019-08-27 RX ORDER — SODIUM CHLORIDE FOR INHALATION 3 %
4 VIAL, NEBULIZER (ML) INHALATION DAILY
Status: DISCONTINUED | OUTPATIENT
Start: 2019-08-27 | End: 2019-08-30 | Stop reason: HOSPADM

## 2019-08-27 RX ORDER — INSULIN GLARGINE 100 [IU]/ML
10 INJECTION, SOLUTION SUBCUTANEOUS DAILY
Status: DISCONTINUED | OUTPATIENT
Start: 2019-08-27 | End: 2019-08-28

## 2019-08-27 RX ORDER — ASPIRIN 81 MG/1
81 TABLET, CHEWABLE ORAL DAILY
Status: DISCONTINUED | OUTPATIENT
Start: 2019-08-27 | End: 2019-08-28

## 2019-08-27 RX ORDER — FUROSEMIDE 10 MG/ML
40 INJECTION INTRAMUSCULAR; INTRAVENOUS DAILY
Status: DISCONTINUED | OUTPATIENT
Start: 2019-08-28 | End: 2019-08-28

## 2019-08-27 RX ADMIN — SUCRALFATE 1 G: 1 TABLET ORAL at 12:40

## 2019-08-27 RX ADMIN — INSULIN LISPRO 3 UNITS: 100 INJECTION, SOLUTION INTRAVENOUS; SUBCUTANEOUS at 12:39

## 2019-08-27 RX ADMIN — SUCRALFATE 1 G: 1 TABLET ORAL at 17:28

## 2019-08-27 RX ADMIN — FUROSEMIDE 40 MG: 10 INJECTION, SOLUTION INTRAMUSCULAR; INTRAVENOUS at 10:12

## 2019-08-27 RX ADMIN — Medication 10 ML: at 03:03

## 2019-08-27 RX ADMIN — FORMOTEROL FUMARATE DIHYDRATE 20 MCG: 20 SOLUTION RESPIRATORY (INHALATION) at 20:22

## 2019-08-27 RX ADMIN — AMPICILLIN SODIUM AND SULBACTAM SODIUM 3 G: 2; 1 INJECTION, POWDER, FOR SOLUTION INTRAMUSCULAR; INTRAVENOUS at 05:40

## 2019-08-27 RX ADMIN — ATORVASTATIN CALCIUM 20 MG: 10 TABLET, FILM COATED ORAL at 20:44

## 2019-08-27 RX ADMIN — PANTOPRAZOLE SODIUM 40 MG: 40 INJECTION, POWDER, FOR SOLUTION INTRAVENOUS at 05:41

## 2019-08-27 RX ADMIN — Medication 10 ML: at 20:44

## 2019-08-27 RX ADMIN — INSULIN GLARGINE 10 UNITS: 100 INJECTION, SOLUTION SUBCUTANEOUS at 10:11

## 2019-08-27 RX ADMIN — BUDESONIDE 250 MCG: 0.25 SUSPENSION RESPIRATORY (INHALATION) at 12:02

## 2019-08-27 RX ADMIN — ALBUTEROL SULFATE 2.5 MG: 2.5 SOLUTION RESPIRATORY (INHALATION) at 20:22

## 2019-08-27 RX ADMIN — SUCRALFATE 1 G: 1 TABLET ORAL at 05:41

## 2019-08-27 RX ADMIN — METHYLPREDNISOLONE SODIUM SUCCINATE 20 MG: 40 INJECTION, POWDER, FOR SOLUTION INTRAMUSCULAR; INTRAVENOUS at 03:03

## 2019-08-27 RX ADMIN — AMPICILLIN SODIUM AND SULBACTAM SODIUM 3 G: 2; 1 INJECTION, POWDER, FOR SOLUTION INTRAMUSCULAR; INTRAVENOUS at 17:28

## 2019-08-27 RX ADMIN — INSULIN LISPRO 8 UNITS: 100 INJECTION, SOLUTION INTRAVENOUS; SUBCUTANEOUS at 05:50

## 2019-08-27 RX ADMIN — PREDNISONE 20 MG: 20 TABLET ORAL at 12:50

## 2019-08-27 RX ADMIN — ASPIRIN 81 MG 81 MG: 81 TABLET ORAL at 17:28

## 2019-08-27 RX ADMIN — Medication 10 ML: at 05:41

## 2019-08-27 RX ADMIN — FORMOTEROL FUMARATE DIHYDRATE 20 MCG: 20 SOLUTION RESPIRATORY (INHALATION) at 12:01

## 2019-08-27 RX ADMIN — BUDESONIDE 250 MCG: 0.25 SUSPENSION RESPIRATORY (INHALATION) at 20:23

## 2019-08-27 RX ADMIN — ALBUTEROL SULFATE 2.5 MG: 2.5 SOLUTION RESPIRATORY (INHALATION) at 12:00

## 2019-08-27 RX ADMIN — SODIUM CHLORIDE SOLN NEBU 3% 4 ML: 3 NEBU SOLN at 12:03

## 2019-08-27 RX ADMIN — ENOXAPARIN SODIUM 30 MG: 30 INJECTION SUBCUTANEOUS at 10:12

## 2019-08-27 RX ADMIN — LEVOTHYROXINE SODIUM 75 MCG: 75 TABLET ORAL at 05:41

## 2019-08-27 RX ADMIN — AMPICILLIN SODIUM AND SULBACTAM SODIUM 3 G: 2; 1 INJECTION, POWDER, FOR SOLUTION INTRAMUSCULAR; INTRAVENOUS at 12:40

## 2019-08-27 RX ADMIN — Medication 10 ML: at 10:13

## 2019-08-27 RX ADMIN — INSULIN LISPRO 3 UNITS: 100 INJECTION, SOLUTION INTRAVENOUS; SUBCUTANEOUS at 17:28

## 2019-08-27 RX ADMIN — ALBUTEROL SULFATE 2.5 MG: 2.5 SOLUTION RESPIRATORY (INHALATION) at 03:22

## 2019-08-27 ASSESSMENT — PAIN SCALES - GENERAL
PAINLEVEL_OUTOF10: 0

## 2019-08-27 NOTE — DISCHARGE INSTR - COC
No Isolation            Nurse Assessment:  Last Vital Signs: BP (!) 153/71   Pulse 77   Temp 98.7 °F (37.1 °C) (Temporal)   Resp 20   Ht 6' (1.829 m)   Wt 173 lb 1 oz (78.5 kg)   SpO2 94%   BMI 23.47 kg/m²     Last documented pain score (0-10 scale): Pain Level: 0  Last Weight:   Wt Readings from Last 1 Encounters:   08/26/19 173 lb 1 oz (78.5 kg)     Mental Status:  disoriented and alert    IV Access:  - None    Nursing Mobility/ADLs:  Walking   Dependent  Transfer  Dependent  Bathing  Dependent  Dressing  Dependent  Toileting  Dependent  Feeding  Dependent  Med Admin  Dependent  Med Delivery   crushed and through peg tube    Wound Care Documentation and Therapy:  Wound 08/22/19 Hip Right (Active)   Wound Length (cm) 2 cm 8/23/2019  8:00 AM   Wound Width (cm) 2 cm 8/23/2019  8:00 AM   Wound Depth (cm) 0 cm 8/23/2019  8:00 AM   Wound Surface Area (cm^2) 4 cm^2 8/23/2019  8:00 AM   Wound Volume (cm^3) 0 cm^3 8/23/2019  8:00 AM   Wound Assessment Fragile;Pink 8/26/2019  8:18 PM   Drainage Amount None 8/26/2019  8:18 PM   Drainage Description Clear;Yellow 8/26/2019  8:18 PM   Odor None 8/26/2019  8:18 PM   Number of days: 4        Elimination:  Continence:   · Bowel: No  · Bladder: No  Urinary Catheter: {Urinary Catheter:[id number]}   Colostomy/Ileostomy/Ileal Conduit: No       Date of Last BM: ***    Intake/Output Summary (Last 24 hours) at 8/27/2019 1508  Last data filed at 8/27/2019 1438  Gross per 24 hour   Intake 1828 ml   Output 4650 ml   Net -2822 ml     I/O last 3 completed shifts: In: 1828 [NG/GT:1628; IV Piggyback:200]  Out: 4650 [Urine:4650]    Safety Concerns:      At Risk for Falls and Aspiration Risk    Impairments/Disabilities:      L side flaccid    Nutrition Therapy:  Current Nutrition Therapy:   Tube feedings: Diabetic    Routes of Feeding: Gastrostomy Tube  Liquids: No Liquids  Daily Fluid Restriction: no  Last Modified Barium Swallow with Video (Video Swallowing Test): done on

## 2019-08-27 NOTE — PROGRESS NOTES
Elmo Benson M.D.,[facility]  Kaylie Corley D.O., F.A.C.O.I., Valerie Mendieta M.D. Colt Salas M.D., Irma Cramer M.D. Ronit Foster D.O. Daily Pulmonary Progress Note    Patient:  Korey Sharma 80 y.o. male MRN: [id number]     Date of Service: 8/27/2019      Synopsis     We are following patient for acute respiratory failure with hypoxia    \"CC\" altered mental status    Code status: DNR-CCA      Subjective      Patient was seen and examined. He is lying in bed in no acute distress. His oxygen has been weaned to 3 L nasal cannula. He has a weak moist cough with difficulty expectorating. He is tolerating chest vest therapy to assist with mucus clearance. Chest x-ray today with improvement in pulmonary congestion. Will decrease diuresis dosing today. Urine output 1.6 L today, yesterday 3.8 L out in 24 hrs. Speech seems slightly improved. Review of Systems: Not able to fully assess due to his mentation  Constitutional: Denies fever, weight loss, night sweats, and fatigue  Skin: Denies pigmentation, dark lesions, and rashes   HEENT: Denies hearing loss, tinnitus, ear drainage, epistaxis, sore throat, and hoarseness. Cardiovascular: Denies palpitations, chest pain, and chest pressure. Respiratory: Denies cough, dyspnea at rest, hemoptysis, apnea, and choking.   Gastrointestinal: Denies nausea, vomiting, poor appetite, diarrhea, heartburn or reflux      24-hour events:  None    Objective   Vitals: BP (!) 153/71   Pulse 77   Temp 98.7 °F (37.1 °C) (Temporal)   Resp 20   Ht 6' (1.829 m)   Wt 173 lb 1 oz (78.5 kg)   SpO2 94%   BMI 23.47 kg/m²     I/O:    Intake/Output Summary (Last 24 hours) at 8/27/2019 1508  Last data filed at 8/27/2019 1438  Gross per 24 hour   Intake 1828 ml   Output 4650 ml   Net -2822 ml       Vent Information  FiO2 : 3 %                CURRENT MEDS :  Scheduled Meds:   insulin lispro  0-18 Units Subcutaneous Q6H    insulin glargine  10 Units

## 2019-08-27 NOTE — PROGRESS NOTES
There is subtle diminished attenuation in the right temporal lobe and in the right basal ganglia probably representing an acute infarct. An asymmetric soft tissue density projecting from the right caudate head laterally may represent an artifact related to craniocaudal angulation. Consider correlation with MRI scanning. There is atrophy and chronic microvascular ischemic disease. There are very mild inflammatory changes in the right maxillary sinus. Probable early right MCA infarct with involvement of the right temporal lobe and right basal ganglia. Recommend MRI scanning for further evaluation as well as to clarify asymmetric findings at the right caudate head. Xr Chest Portable    Result Date: 2019  Patient MRN: [id number] : 1927 Age:  80 years Gender: Male Order Date: 2019 1:45 PM Exam: XR CHEST PORTABLE Number of Images: 1 view Indication:  Shortness of breath Comparison: None. Findings: The lungs are symmetrically expanded, and show extensive edema or airspace disease asymmetrically distributed throughout the right hemithorax. The lateral sulcus on the right is indistinct, suggesting pleural fluid in addition to the pulmonary edema or pneumonia. Surprisingly, the left hemithorax is clear, without evidence of effusion or pneumothorax. 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 There is no evidence of acute decompensation. Skeletal structures show no definite evidence of acute pathology. There is degenerative change of the spine and both shoulders. Overlying EKG leads and oxygen tubing are present. There appear to be surgical clips beneath the mandible in the right upper neck at the margin of the study. Asymmetric density throughout the right hemithorax could be indicative of diffuse pneumonia or pulmonary edema, cumulated in the right decubitus position. There is no evidence of acute cardiac decompensation.  CT of the chest to better

## 2019-08-28 LAB
ANION GAP SERPL CALCULATED.3IONS-SCNC: 12 MMOL/L (ref 7–16)
BUN BLDV-MCNC: 70 MG/DL (ref 8–23)
CALCIUM SERPL-MCNC: 8.2 MG/DL (ref 8.6–10.2)
CHLORIDE BLD-SCNC: 114 MMOL/L (ref 98–107)
CO2: 30 MMOL/L (ref 22–29)
CREAT SERPL-MCNC: 1.4 MG/DL (ref 0.7–1.2)
GFR AFRICAN AMERICAN: 57
GFR NON-AFRICAN AMERICAN: 47 ML/MIN/1.73
GLUCOSE BLD-MCNC: 245 MG/DL (ref 74–99)
METER GLUCOSE: 186 MG/DL (ref 74–99)
METER GLUCOSE: 197 MG/DL (ref 74–99)
METER GLUCOSE: 199 MG/DL (ref 74–99)
METER GLUCOSE: 208 MG/DL (ref 74–99)
METER GLUCOSE: 264 MG/DL (ref 74–99)
POTASSIUM REFLEX MAGNESIUM: 4.7 MMOL/L (ref 3.5–5)
SODIUM BLD-SCNC: 156 MMOL/L (ref 132–146)

## 2019-08-28 PROCEDURE — 2700000000 HC OXYGEN THERAPY PER DAY

## 2019-08-28 PROCEDURE — 6370000000 HC RX 637 (ALT 250 FOR IP): Performed by: INTERNAL MEDICINE

## 2019-08-28 PROCEDURE — 6360000002 HC RX W HCPCS: Performed by: NURSE PRACTITIONER

## 2019-08-28 PROCEDURE — 80048 BASIC METABOLIC PNL TOTAL CA: CPT

## 2019-08-28 PROCEDURE — 94669 MECHANICAL CHEST WALL OSCILL: CPT

## 2019-08-28 PROCEDURE — 36415 COLL VENOUS BLD VENIPUNCTURE: CPT

## 2019-08-28 PROCEDURE — 94660 CPAP INITIATION&MGMT: CPT

## 2019-08-28 PROCEDURE — 6370000000 HC RX 637 (ALT 250 FOR IP): Performed by: NURSE PRACTITIONER

## 2019-08-28 PROCEDURE — 6360000002 HC RX W HCPCS: Performed by: STUDENT IN AN ORGANIZED HEALTH CARE EDUCATION/TRAINING PROGRAM

## 2019-08-28 PROCEDURE — 2580000003 HC RX 258: Performed by: NURSE PRACTITIONER

## 2019-08-28 PROCEDURE — 82962 GLUCOSE BLOOD TEST: CPT

## 2019-08-28 PROCEDURE — 94640 AIRWAY INHALATION TREATMENT: CPT

## 2019-08-28 PROCEDURE — C9113 INJ PANTOPRAZOLE SODIUM, VIA: HCPCS | Performed by: STUDENT IN AN ORGANIZED HEALTH CARE EDUCATION/TRAINING PROGRAM

## 2019-08-28 PROCEDURE — 6370000000 HC RX 637 (ALT 250 FOR IP): Performed by: STUDENT IN AN ORGANIZED HEALTH CARE EDUCATION/TRAINING PROGRAM

## 2019-08-28 PROCEDURE — 2060000000 HC ICU INTERMEDIATE R&B

## 2019-08-28 PROCEDURE — 2580000003 HC RX 258: Performed by: STUDENT IN AN ORGANIZED HEALTH CARE EDUCATION/TRAINING PROGRAM

## 2019-08-28 RX ORDER — INSULIN GLARGINE 100 [IU]/ML
5 INJECTION, SOLUTION SUBCUTANEOUS ONCE
Status: COMPLETED | OUTPATIENT
Start: 2019-08-28 | End: 2019-08-28

## 2019-08-28 RX ORDER — PANTOPRAZOLE SODIUM 40 MG/1
40 GRANULE, DELAYED RELEASE ORAL
Status: DISCONTINUED | OUTPATIENT
Start: 2019-08-28 | End: 2019-08-30 | Stop reason: HOSPADM

## 2019-08-28 RX ORDER — ASPIRIN 81 MG/1
81 TABLET, CHEWABLE ORAL DAILY
Status: DISCONTINUED | OUTPATIENT
Start: 2019-08-28 | End: 2019-08-30 | Stop reason: HOSPADM

## 2019-08-28 RX ORDER — ATORVASTATIN CALCIUM 10 MG/1
20 TABLET, FILM COATED ORAL NIGHTLY
Status: DISCONTINUED | OUTPATIENT
Start: 2019-08-28 | End: 2019-08-30 | Stop reason: HOSPADM

## 2019-08-28 RX ORDER — LEVOTHYROXINE SODIUM 0.07 MG/1
75 TABLET ORAL DAILY
Status: DISCONTINUED | OUTPATIENT
Start: 2019-08-29 | End: 2019-08-30 | Stop reason: HOSPADM

## 2019-08-28 RX ORDER — CHLORHEXIDINE GLUCONATE 0.12 MG/ML
15 RINSE ORAL 2 TIMES DAILY
Status: DISCONTINUED | OUTPATIENT
Start: 2019-08-28 | End: 2019-08-30 | Stop reason: HOSPADM

## 2019-08-28 RX ORDER — INSULIN GLARGINE 100 [IU]/ML
15 INJECTION, SOLUTION SUBCUTANEOUS DAILY
Status: DISCONTINUED | OUTPATIENT
Start: 2019-08-29 | End: 2019-08-30 | Stop reason: HOSPADM

## 2019-08-28 RX ADMIN — FORMOTEROL FUMARATE DIHYDRATE 20 MCG: 20 SOLUTION RESPIRATORY (INHALATION) at 12:50

## 2019-08-28 RX ADMIN — AMPICILLIN SODIUM AND SULBACTAM SODIUM 3 G: 2; 1 INJECTION, POWDER, FOR SOLUTION INTRAMUSCULAR; INTRAVENOUS at 13:46

## 2019-08-28 RX ADMIN — AMPICILLIN SODIUM AND SULBACTAM SODIUM 3 G: 2; 1 INJECTION, POWDER, FOR SOLUTION INTRAMUSCULAR; INTRAVENOUS at 06:08

## 2019-08-28 RX ADMIN — ALBUTEROL SULFATE 2.5 MG: 2.5 SOLUTION RESPIRATORY (INHALATION) at 12:47

## 2019-08-28 RX ADMIN — INSULIN GLARGINE 5 UNITS: 100 INJECTION, SOLUTION SUBCUTANEOUS at 08:59

## 2019-08-28 RX ADMIN — INSULIN LISPRO 3 UNITS: 100 INJECTION, SOLUTION INTRAVENOUS; SUBCUTANEOUS at 12:26

## 2019-08-28 RX ADMIN — SUCRALFATE 1 G: 1 TABLET ORAL at 17:59

## 2019-08-28 RX ADMIN — CHLORHEXIDINE GLUCONATE 0.12% ORAL RINSE 15 ML: 1.2 LIQUID ORAL at 12:26

## 2019-08-28 RX ADMIN — SODIUM CHLORIDE SOLN NEBU 3% 4 ML: 3 NEBU SOLN at 12:51

## 2019-08-28 RX ADMIN — SUCRALFATE 1 G: 1 TABLET ORAL at 06:09

## 2019-08-28 RX ADMIN — INSULIN GLARGINE 10 UNITS: 100 INJECTION, SOLUTION SUBCUTANEOUS at 06:15

## 2019-08-28 RX ADMIN — ALBUTEROL SULFATE 2.5 MG: 2.5 SOLUTION RESPIRATORY (INHALATION) at 02:28

## 2019-08-28 RX ADMIN — Medication 10 ML: at 23:53

## 2019-08-28 RX ADMIN — ATORVASTATIN CALCIUM 20 MG: 10 TABLET, FILM COATED ORAL at 23:53

## 2019-08-28 RX ADMIN — ALBUTEROL SULFATE 2.5 MG: 2.5 SOLUTION RESPIRATORY (INHALATION) at 21:09

## 2019-08-28 RX ADMIN — ASPIRIN 81 MG 81 MG: 81 TABLET ORAL at 09:00

## 2019-08-28 RX ADMIN — PANTOPRAZOLE SODIUM 40 MG: 40 GRANULE, DELAYED RELEASE ORAL at 09:45

## 2019-08-28 RX ADMIN — FORMOTEROL FUMARATE DIHYDRATE 20 MCG: 20 SOLUTION RESPIRATORY (INHALATION) at 21:10

## 2019-08-28 RX ADMIN — Medication 10 ML: at 09:00

## 2019-08-28 RX ADMIN — CHLORHEXIDINE GLUCONATE 0.12% ORAL RINSE 15 ML: 1.2 LIQUID ORAL at 23:53

## 2019-08-28 RX ADMIN — INSULIN LISPRO 9 UNITS: 100 INJECTION, SOLUTION INTRAVENOUS; SUBCUTANEOUS at 00:20

## 2019-08-28 RX ADMIN — PANTOPRAZOLE SODIUM 40 MG: 40 INJECTION, POWDER, FOR SOLUTION INTRAVENOUS at 06:08

## 2019-08-28 RX ADMIN — SUCRALFATE 1 G: 1 TABLET ORAL at 12:27

## 2019-08-28 RX ADMIN — SUCRALFATE 1 G: 1 TABLET ORAL at 00:18

## 2019-08-28 RX ADMIN — INSULIN LISPRO 6 UNITS: 100 INJECTION, SOLUTION INTRAVENOUS; SUBCUTANEOUS at 17:58

## 2019-08-28 RX ADMIN — INSULIN LISPRO 3 UNITS: 100 INJECTION, SOLUTION INTRAVENOUS; SUBCUTANEOUS at 23:54

## 2019-08-28 RX ADMIN — LEVOTHYROXINE SODIUM 75 MCG: 75 TABLET ORAL at 06:09

## 2019-08-28 RX ADMIN — ENOXAPARIN SODIUM 30 MG: 30 INJECTION SUBCUTANEOUS at 09:00

## 2019-08-28 RX ADMIN — PREDNISONE 20 MG: 20 TABLET ORAL at 09:00

## 2019-08-28 RX ADMIN — AMPICILLIN SODIUM AND SULBACTAM SODIUM 3 G: 2; 1 INJECTION, POWDER, FOR SOLUTION INTRAMUSCULAR; INTRAVENOUS at 00:17

## 2019-08-28 RX ADMIN — BUDESONIDE 250 MCG: 0.25 SUSPENSION RESPIRATORY (INHALATION) at 21:12

## 2019-08-28 RX ADMIN — INSULIN LISPRO 3 UNITS: 100 INJECTION, SOLUTION INTRAVENOUS; SUBCUTANEOUS at 06:15

## 2019-08-28 RX ADMIN — BUDESONIDE 250 MCG: 0.25 SUSPENSION RESPIRATORY (INHALATION) at 12:50

## 2019-08-28 ASSESSMENT — PAIN SCALES - GENERAL
PAINLEVEL_OUTOF10: 0

## 2019-08-28 NOTE — PLAN OF CARE
Problem: Falls - Risk of:  Goal: Will remain free from falls  Description  Will remain free from falls  8/28/2019 1657 by Martha Trujillo RN  Outcome: Met This Shift     Problem: Falls - Risk of:  Goal: Absence of physical injury  Description  Absence of physical injury  8/28/2019 1657 by Martha Trujillo RN  Outcome: Met This Shift     Problem: Risk for Impaired Skin Integrity  Goal: Tissue integrity - skin and mucous membranes  Description  Structural intactness and normal physiological function of skin and  mucous membranes.   8/28/2019 1657 by Martha Trujillo RN  Outcome: Met This Shift

## 2019-08-28 NOTE — PROGRESS NOTES
abd ; edentulous ; gastrostomy ; L facial droop ; A&O x 1 ; muscle and fat wasting      · Wound Type: Open Wounds(wound noted to R hip )     · Current Nutrition Therapies:  · Oral Diet Orders: NPO   · Oral Diet intake: NPO  · Oral Nutrition Supplement (ONS) Orders: None  · ONS intake: NPO     · Tube Feeding (TF) Orders:   · Feeding Route: Gastrostomy  · Formula: 1.5 Diabetic  · Rate [email or web address]    · Volume (ml/day): 1080ml  · Duration: Continuous  · Water Flushes: 250ml q 4 hours  · Current TF & Flush Orders Provides: Glucerna 1.5 @ 45/hr to provide 1080ml, 1620 calories, 89g protein, 820ml water, 2320ml total water      · Anthropometric Measures:  · Ht: 6' (182.9 cm)   · Current Body Wt: 160 lb (72.6 kg)(8/28/19, bedscale per RD)  · Admission Body Wt: 146 lb (66.2 kg)(8/19/19, bedscale)  · Usual Body Wt: (155-160 per wife)  · Weight Change: No weights available in EMR history ;  Wife states UBW of 155-160#   · Ideal Body Wt: 178 lb (80.7 kg), % Ideal Body 90%  · BMI Classification: BMI 18.5 - 24.9 Normal Weight    Nutrition Interventions:   Continue NPO, Modify current Tube Feeding  Continued Inpatient Monitoring, Coordination of Care, Speech Therapy, Swallow Evaluation    Nutrition Evaluation:   · Evaluation: Progressing toward goals   · Goals: Tube Feeding will meet nutritional needs with good tolerance    · Monitoring: Nutrition Progression, TF Intake, TF Tolerance, Skin Integrity, Wound Healing, I&O, Monitor Hemodynamic Status, Monitor Bowel Function, Mental Status/Confusion, Weight, Pertinent Labs, Chewing/Swallowing      Electronically signed by Petra Falk RD, LD on 8/28/19 at 2:13 PM    Contact Number: [phone]

## 2019-08-28 NOTE — PLAN OF CARE
Nutrition Problem:  Moderate malnutrition, In context of acute illness or injury  Intervention: Food and/or Nutrient Delivery: Continue NPO, Modify current Tube Feeding  Nutritional Goals: Tube Feeding will meet nutritional needs with good tolerance

## 2019-08-28 NOTE — PROGRESS NOTES
Pt SpO2 saturation was 85% on assessment. Oxygen was put up to 12 L and pt is sating around 92-93%. Respiratory was called for breathing treatment.

## 2019-08-28 NOTE — PROGRESS NOTES
12 cmH20  CPAP/EPAP: 6 cmH2O     CURRENT MEDS :  Scheduled Meds:   [START ON 8/29/2019] insulin glargine  15 Units Subcutaneous Daily    [START ON 8/29/2019] levothyroxine  75 mcg PEG Tube Daily    atorvastatin  20 mg PEG Tube Nightly    aspirin  81 mg PEG Tube Daily    pantoprazole sodium  40 mg Per G Tube QAM AC    chlorhexidine  15 mL Mouth/Throat BID    insulin lispro  0-18 Units Subcutaneous Q6H    predniSONE  20 mg Oral Daily    sodium chloride (Inhalant)  4 mL Nebulization Daily    sodium chloride (PF)  10 mL Intravenous Daily    sucralfate  1 g PEG Tube 4 times per day    formoterol  20 mcg Nebulization BID    budesonide  250 mcg Nebulization BID    albuterol  2.5 mg Nebulization Q6H    sodium chloride flush  10 mL Intravenous 2 times per day    enoxaparin  30 mg Subcutaneous Daily       Physical Exam:  General Appearance: appears comfortable in no acute distress. HEENT: Normocephalic atraumatic without obvious abnormality   Neck: Lips, mucosa, and tongue normal.  Supple, symmetrical, trachea midline, no adenopathy;thyroid:  no enlargement/tenderness/nodules or JVD. Lung: Breath sounds diminished R>L. Slight wheeze. Respirations   unlabored. Symmetrical expansion. Heart: RRR, normal S1, S2. No MRG  Abdomen: Soft, NT, ND. BS present x 4 quadrants. No bruit or organomegaly. Extremities: Pedal pulses 2+ symmetric b/l. Extremities normal, no cyanosis, clubbing, or edema. Musculokeletal: No joint swelling, no muscle tenderness. ROM normal in all joints of extremities. Neurologic: Mental status: unable to move left. some aphasia and left lateral gaze     Pertinent/ New Labs and Imaging Studies     Imaging Personally Reviewed:  8/27/2019 chest x-ray   Order Date: 8/27/2019 6:00 AM       Exam: XR CHEST PORTABLE       Number of Views: 1       Indication:   Edema, aspiration pneumonia       Comparison: 8/26/2019       Findings:  There is a stable, large cardiomediastinal silhouette with

## 2019-08-28 NOTE — PLAN OF CARE
Problem: Falls - Risk of:  Goal: Will remain free from falls  Description  Will remain free from falls  8/28/2019 0439 by Jaison Saba RN  Outcome: Met This Shift     Problem: Falls - Risk of:  Goal: Will remain free from falls  Description  Will remain free from falls  8/28/2019 0438 by Jaison Saba RN  Outcome: Met This Shift     Problem: Falls - Risk of:  Goal: Absence of physical injury  Description  Absence of physical injury  8/28/2019 0438 by Jaison Saba RN  Outcome: Met This Shift     Problem: Risk for Impaired Skin Integrity  Goal: Tissue integrity - skin and mucous membranes  Description  Structural intactness and normal physiological function of skin and  mucous membranes.   Outcome: Met This Shift     Problem: HEMODYNAMIC STATUS  Goal: Patient has stable vital signs and fluid balance  8/28/2019 0439 by Jaison Saba RN  Outcome: Met This Shift  8/28/2019 0438 by Jaison Saba RN  Outcome: Met This Shift

## 2019-08-29 ENCOUNTER — APPOINTMENT (OUTPATIENT)
Dept: GENERAL RADIOLOGY | Age: 84
DRG: 064 | End: 2019-08-29
Payer: MEDICARE

## 2019-08-29 LAB
ANION GAP SERPL CALCULATED.3IONS-SCNC: 9 MMOL/L (ref 7–16)
BUN BLDV-MCNC: 63 MG/DL (ref 8–23)
CALCIUM SERPL-MCNC: 8.2 MG/DL (ref 8.6–10.2)
CHLORIDE BLD-SCNC: 108 MMOL/L (ref 98–107)
CO2: 34 MMOL/L (ref 22–29)
CREAT SERPL-MCNC: 1.3 MG/DL (ref 0.7–1.2)
GFR AFRICAN AMERICAN: >60
GFR NON-AFRICAN AMERICAN: 52 ML/MIN/1.73
GLUCOSE BLD-MCNC: 236 MG/DL (ref 74–99)
HCT VFR BLD CALC: 43.7 % (ref 37–54)
HEMOGLOBIN: 13.1 G/DL (ref 12.5–16.5)
MCH RBC QN AUTO: 28.1 PG (ref 26–35)
MCHC RBC AUTO-ENTMCNC: 30 % (ref 32–34.5)
MCV RBC AUTO: 93.8 FL (ref 80–99.9)
METER GLUCOSE: 162 MG/DL (ref 74–99)
METER GLUCOSE: 207 MG/DL (ref 74–99)
METER GLUCOSE: 227 MG/DL (ref 74–99)
METER GLUCOSE: 238 MG/DL (ref 74–99)
PDW BLD-RTO: 16.1 FL (ref 11.5–15)
PLATELET # BLD: 153 E9/L (ref 130–450)
PMV BLD AUTO: 12 FL (ref 7–12)
POTASSIUM REFLEX MAGNESIUM: 4.7 MMOL/L (ref 3.5–5)
POTASSIUM SERPL-SCNC: 4.7 MMOL/L (ref 3.5–5)
RBC # BLD: 4.66 E12/L (ref 3.8–5.8)
SODIUM BLD-SCNC: 151 MMOL/L (ref 132–146)
WBC # BLD: 14.3 E9/L (ref 4.5–11.5)

## 2019-08-29 PROCEDURE — 6370000000 HC RX 637 (ALT 250 FOR IP): Performed by: STUDENT IN AN ORGANIZED HEALTH CARE EDUCATION/TRAINING PROGRAM

## 2019-08-29 PROCEDURE — 80048 BASIC METABOLIC PNL TOTAL CA: CPT

## 2019-08-29 PROCEDURE — 36415 COLL VENOUS BLD VENIPUNCTURE: CPT

## 2019-08-29 PROCEDURE — 6370000000 HC RX 637 (ALT 250 FOR IP): Performed by: INTERNAL MEDICINE

## 2019-08-29 PROCEDURE — 6360000002 HC RX W HCPCS: Performed by: STUDENT IN AN ORGANIZED HEALTH CARE EDUCATION/TRAINING PROGRAM

## 2019-08-29 PROCEDURE — 2580000003 HC RX 258: Performed by: STUDENT IN AN ORGANIZED HEALTH CARE EDUCATION/TRAINING PROGRAM

## 2019-08-29 PROCEDURE — 71045 X-RAY EXAM CHEST 1 VIEW: CPT

## 2019-08-29 PROCEDURE — 94660 CPAP INITIATION&MGMT: CPT

## 2019-08-29 PROCEDURE — 94669 MECHANICAL CHEST WALL OSCILL: CPT

## 2019-08-29 PROCEDURE — 97535 SELF CARE MNGMENT TRAINING: CPT

## 2019-08-29 PROCEDURE — 2060000000 HC ICU INTERMEDIATE R&B

## 2019-08-29 PROCEDURE — 97530 THERAPEUTIC ACTIVITIES: CPT

## 2019-08-29 PROCEDURE — 6370000000 HC RX 637 (ALT 250 FOR IP): Performed by: NURSE PRACTITIONER

## 2019-08-29 PROCEDURE — 2700000000 HC OXYGEN THERAPY PER DAY

## 2019-08-29 PROCEDURE — 94640 AIRWAY INHALATION TREATMENT: CPT

## 2019-08-29 PROCEDURE — 99232 SBSQ HOSP IP/OBS MODERATE 35: CPT | Performed by: NURSE PRACTITIONER

## 2019-08-29 PROCEDURE — 94760 N-INVAS EAR/PLS OXIMETRY 1: CPT

## 2019-08-29 PROCEDURE — 82962 GLUCOSE BLOOD TEST: CPT

## 2019-08-29 PROCEDURE — 2580000003 HC RX 258: Performed by: NURSE PRACTITIONER

## 2019-08-29 PROCEDURE — 85027 COMPLETE CBC AUTOMATED: CPT

## 2019-08-29 RX ADMIN — ALBUTEROL SULFATE 2.5 MG: 2.5 SOLUTION RESPIRATORY (INHALATION) at 10:09

## 2019-08-29 RX ADMIN — CHLORHEXIDINE GLUCONATE 0.12% ORAL RINSE 15 ML: 1.2 LIQUID ORAL at 08:14

## 2019-08-29 RX ADMIN — INSULIN GLARGINE 15 UNITS: 100 INJECTION, SOLUTION SUBCUTANEOUS at 07:08

## 2019-08-29 RX ADMIN — INSULIN LISPRO 6 UNITS: 100 INJECTION, SOLUTION INTRAVENOUS; SUBCUTANEOUS at 07:08

## 2019-08-29 RX ADMIN — SUCRALFATE 1 G: 1 TABLET ORAL at 00:17

## 2019-08-29 RX ADMIN — ALBUTEROL SULFATE 2.5 MG: 2.5 SOLUTION RESPIRATORY (INHALATION) at 01:42

## 2019-08-29 RX ADMIN — SUCRALFATE 1 G: 1 TABLET ORAL at 17:19

## 2019-08-29 RX ADMIN — PREDNISONE 20 MG: 20 TABLET ORAL at 07:58

## 2019-08-29 RX ADMIN — ATORVASTATIN CALCIUM 20 MG: 10 TABLET, FILM COATED ORAL at 22:48

## 2019-08-29 RX ADMIN — ASPIRIN 81 MG 81 MG: 81 TABLET ORAL at 07:58

## 2019-08-29 RX ADMIN — PANTOPRAZOLE SODIUM 40 MG: 40 GRANULE, DELAYED RELEASE ORAL at 06:53

## 2019-08-29 RX ADMIN — Medication 10 ML: at 22:48

## 2019-08-29 RX ADMIN — Medication 10 ML: at 07:58

## 2019-08-29 RX ADMIN — BUDESONIDE 250 MCG: 0.25 SUSPENSION RESPIRATORY (INHALATION) at 18:48

## 2019-08-29 RX ADMIN — BUDESONIDE 250 MCG: 0.25 SUSPENSION RESPIRATORY (INHALATION) at 10:11

## 2019-08-29 RX ADMIN — ALBUTEROL SULFATE 2.5 MG: 2.5 SOLUTION RESPIRATORY (INHALATION) at 13:37

## 2019-08-29 RX ADMIN — CHLORHEXIDINE GLUCONATE 0.12% ORAL RINSE 15 ML: 1.2 LIQUID ORAL at 22:48

## 2019-08-29 RX ADMIN — Medication 10 ML: at 08:15

## 2019-08-29 RX ADMIN — LEVOTHYROXINE SODIUM 75 MCG: 75 TABLET ORAL at 06:53

## 2019-08-29 RX ADMIN — SODIUM CHLORIDE SOLN NEBU 3% 4 ML: 3 NEBU SOLN at 10:10

## 2019-08-29 RX ADMIN — FORMOTEROL FUMARATE DIHYDRATE 20 MCG: 20 SOLUTION RESPIRATORY (INHALATION) at 10:11

## 2019-08-29 RX ADMIN — ALBUTEROL SULFATE 2.5 MG: 2.5 SOLUTION RESPIRATORY (INHALATION) at 18:49

## 2019-08-29 RX ADMIN — FORMOTEROL FUMARATE DIHYDRATE 20 MCG: 20 SOLUTION RESPIRATORY (INHALATION) at 18:50

## 2019-08-29 RX ADMIN — SUCRALFATE 1 G: 1 TABLET ORAL at 06:53

## 2019-08-29 RX ADMIN — INSULIN LISPRO 6 UNITS: 100 INJECTION, SOLUTION INTRAVENOUS; SUBCUTANEOUS at 17:19

## 2019-08-29 RX ADMIN — ENOXAPARIN SODIUM 30 MG: 30 INJECTION SUBCUTANEOUS at 08:14

## 2019-08-29 RX ADMIN — INSULIN LISPRO 6 UNITS: 100 INJECTION, SOLUTION INTRAVENOUS; SUBCUTANEOUS at 22:52

## 2019-08-29 ASSESSMENT — PAIN SCALES - GENERAL
PAINLEVEL_OUTOF10: 0
PAINLEVEL_OUTOF10: 0

## 2019-08-29 NOTE — PROGRESS NOTES
Palliative Care Department  Palliative Care Progress Note  [facility]    [facility]: [facility]    Referring Provider Cherelle Cisse DO    Reason for Consult:  []  Code status Discussion  [x]  Assist with goals of care  [x]  Psychosocial support  []  Symptom Management  []  Advanced Care Planning    Chief Complaint: Candice Mirza is a 80 y.o. male with chief complaint of left facial droop and garbled speech    Assessment/Plan      Active Hospital Problems    Diagnosis Date Noted    Oropharyngeal dysphagia [R13.12] 08/23/2019    Moderate protein-calorie malnutrition ([address] [E44.0] 08/23/2019    Aspiration pneumonia of right lung ([address] [J69.0]     Goals of care, counseling/discussion [Z71.89]     Palliative care encounter [Z51.5]     Acute ischemic right MCA stroke [facility]) [I63.511] 08/19/2019    Acute respiratory failure with hypoxia ([address] [J96.01] 08/19/2019    Type 2 diabetes mellitus, with long-term current use of insulin ([facility] [E11.9, Z79.4] 08/19/2019    Hypertension [I10] 08/19/2019    Hyperlipidemia [E78.5] 08/19/2019   neurology following  not eligible for TPA  Possible aspiration pna  Pulmonology following    Palliative Care Encounter/Recommendations:      - Goals of care: continue current management and to be determined     - Code Status: DNR-CCA- no intubation     - recommend hospice as chance for meaningful recovery is poor, he will continue will recurrent aspiration, resp failure and infection-will discuss with family when available          Subjective:     HPI:  Candice Mirza is a 80 y.o. male with significant past medical history of diabetes mellitus, hyperlipidemia and hypertension who presented with left facial droop and garbled speech. Probable early right MCA infarct with involvement of the right temporal lobe and right basal ganglia. He was not eligible for TPA. Xray showed white out right lung. He is requiring high flow n/c. Troponin was elevated.  He is DNR CCA with no intubation. Subjective/Events/Discussions:  8/20/19 Alert, dysarthric. His son is at bedside. He has been with his father all night and reports he has been agitated and he was given med that helped. He did try to communicated some. He lives at home with his wife who he cares for and still drives. Family plans to see how he does over the next few day. 8/29/19  Pall med has been asked to see patient again to discuss goals of care. He had peg tube placed. He has been aspirating on oral secretions despite suction, chest vest and nebulizer. Per pulmonology this will be a recurrent problem. Patient is obtunded. No family at bedside. Spoke with RN. He did assist with turning and follows some commands. He only says no to a few questions and otherwise nonverbal.       ROS: unable to obtain   UNLESS STATED ABOVE PATIENT DENIES:  CONSTITUTIONAL:  fever, chill, rigors, nausea, vomiting, fatigue. HEENT: blurry vision, double vision, hearing problem, tinnitus, hoarseness, dysphagia, odynophagia  RESPIRATORY: cough, shortness of breath, sputum expectoration. CARDIOVASCULAR:  Chest pain/pressure, palpitation, syncope, irregular beats  GASTROINTESTINAL:  abdominal or rectal pain, diarrhea, constipation, .   GENITOURINARY:  Burning, frequency, urgency, incontinence,   INTEGUMENTARY: rash, wound, pruritis  HEMATOLOGIC/LYMPHATIC:  Swelling, sores, easy bruising,   MUSCULOSKELETAL:  pain, edema, joint swelling or redness  NEUROLOGICAL:  light headed, dizziness, weakness, tremors    Objective:     Physical Exam  BP (!) 94/52   Pulse 70   Temp 97.2 °F (36.2 °C) (Temporal)   Resp 20   Ht 6' (1.829 m)   Wt 160 lb (72.6 kg) Comment: bedscale per RD  SpO2 100%   BMI 21.70 kg/m²     Gen:  appears stated age, well nourished, in no acute distress  HEENT:  Normocephalic, conjunctiva pink, no drainage, mucosa moist  Neck:  Supple  Lungs:  rhonchi bilaterally, no audible crackles or wheezes noted  Heart[de-identified]  RRR, no murmur, rub, or gallop noted during exam  Abd:  Soft, non tender, non distended, BS+  Ext:  Weakness left side, no edema, pulses present  Skin:  Warm and dry  Neuro:  Obtunded, following a few commands      Current Medications:  Inpatient/Home medications reviewed:    Results/Verification of Data Review  Objective data reviewed: labs, images, records, medication use, vitals and chart      - Advanced Directives: unknown   -Surrogate/Legal NOK: Spouse    Contacts:  Daisy Maillard [phone]    - Spiritual assessment: No spiritual distress identified     - Bereavement and grief: to be determined    - Discharge planning: to be determined    - Prognosis: Guarded    - Referrals to: none today    Time/Communication  Greater than 51% of time spent, total 25 minutes in counseling and coordination of care at the bedside regarding goals of care, diagnosis and prognosis and see above. Dao THORNTON-CNP  Palliative Medicine    Discussed patient and the plan of care with the other IDT members of Palliative Med team and with floor nurse. Thank you for allowing Palliative Medicine to participate in the care of [facility]. Note: This report was completed using computerize voiced recognition software. Every effort has been made to ensure accuracy; however, inadvertent computerized transcription errors may be present.

## 2019-08-29 NOTE — PROGRESS NOTES
1 g, 1 g, PEG Tube, 4 times per day, Fei Kang MD, 1 g at 08/29/19 0653    LORazepam (ATIVAN) injection 0.25 mg, 0.25 mg, Intravenous, Nightly PRN, Fei Kang MD    formoterol (PERFOROMIST) nebulizer solution 20 mcg, 20 mcg, Nebulization, BID, Fei Kang MD, 20 mcg at 08/29/19 1011    budesonide (PULMICORT) nebulizer suspension 250 mcg, 250 mcg, Nebulization, BID, Fei Kang MD, 250 mcg at 08/29/19 1011    barium sulfate 60 % cream 45 g, 45 g, Oral, ONCE PRN, Fei Kang MD    barium sulfate 96 % suspension 58 mL, 58 mL, Oral, ONCE PRN, Fei Kang MD    perflutren lipid microspheres (DEFINITY) injection 1.65 mg, 1.5 mL, Intravenous, ONCE PRN, Fei Kang MD    albuterol (PROVENTIL) nebulizer solution 2.5 mg, 2.5 mg, Nebulization, Q6H, Fei Kang MD, 2.5 mg at 08/29/19 1337    sodium chloride flush 0.9 % injection 10 mL, 10 mL, Intravenous, 2 times per day, Fei Kang MD, 10 mL at 08/29/19 0758    sodium chloride flush 0.9 % injection 10 mL, 10 mL, Intravenous, PRN, Fei Kang MD, 10 mL at 08/27/19 0541    ondansetron (ZOFRAN) injection 4 mg, 4 mg, Intravenous, Q6H PRN, Fei Kang MD    enoxaparin (LOVENOX) injection 30 mg, 30 mg, Subcutaneous, Daily, Fei Kang MD, 30 mg at 08/29/19 9504    Objective:    BP (!) 94/52   Pulse 70   Temp 97.2 °F (36.2 °C) (Temporal)   Resp 20   Ht 6' (1.829 m)   Wt 160 lb (72.6 kg) Comment: bedscale per RD  SpO2 100%   BMI 21.70 kg/m²     Heart:  Reg  Lungs:  rhonchi  Abd: bowel sounds present, nontender, nondistended, no masses  Extrem:  Edema legs    CBC with Differential:  Lab Results   Component Value Date    WBC 14.3 08/29/2019    RBC 4.66 08/29/2019    HGB 13.1 08/29/2019    HCT 43.7 08/29/2019     08/29/2019    MCV 93.8 08/29/2019    MCH 28.1 08/29/2019    MCHC 30.0 08/29/2019    RDW 16.1 08/29/2019    LYMPHOPCT 3.9 08/27/2019    MONOPCT 2.1 08/27/2019    BASOPCT 0.1

## 2019-08-29 NOTE — PROGRESS NOTES
NPO  Peg Tube       Grooming Maximal Assist   Basic grooming task with R UE  Max A  To wash face bed level. Pt initiated washing chin only with max cues however required hand over hand assist to throughly complete task. Stand by Assist    UB Dressing Dependent  Max A  To adjust hospital gown over B Shoulders and around trunk bed level with max verbal/tactile cues required to complete.     Minimal Assist    LB Dressing Dependent  Dependent  To don/doff socks bed level this date. Moderate Assist    Bathing Dependent  Dependent  Per previous tx  Will continue to assess. Moderate Assist    Toileting Dependent   Dependent  Can present. Moderate Assist    Bed Mobility  Supine to sit: Dependent x2  Sit to supine: Dependent x2 Supine<>Sit: Dependent x2  Pt required assist for trunk control and B LE's this date. Max cues for sequencing/safety required.     Supine to sit: Moderate Assist   Sit to supine: Moderate Assist    Functional Transfers NT  NT  D/t safety concerns. Will continue to assess Maximal Assist     Functional Mobility NT   NT     Balance Sitting: Max A (episodes on Mod A)  Posterior / L lateral anita     Standing: NT Sitting: Max/Dep  Pt tolerated sitting at EOB x15 minutes while weightbearing through L UE. Pt demonstrates L lateral and posterior lean requiring Max cues/assist to maintain midline positioning. Max cues for head control and posture required. Pt completed lateral leans onto B elbows for weightbearing and to increase trunk control and core strength x5 reps to each side with pt requiring Max A to complete. Trunk flex/ext completed x5 reps seated at EOB to increase core strength/trunk control to increase sitting balance for functional activities.        Activity Tolerance Poor  Poor  Pt fatigues quickly with light activity this date. Frequent cues to open eyes required throughout treatment.   F   Visual/  Perceptual L lateral head rotation / gaze,   Eyes shut majority of session;

## 2019-08-29 NOTE — PROGRESS NOTES
to EOB requiring assist for BLE and trunk. Pt sat EOB approx 15 minutes with max/dep A for L lateral and posterior lean. Decreased L lateral lean noted when RUE placed on lap. Cervical PROM performed in all planes. Pt performed ankle pumps and LAQ AA to BLE. Pt unsafe to attempt STS d/t general debility and increased lethargy at this time. Pt kept eyes closed during tx session until pt returned to supine in bed. Pt opened eyes and able to track to midline but unable to keeps eyes focused at midline. Eyes return to left. Pt left in chair position with all needs met,  TSM present. Pt given call light. Pt on 7 liters O2 throughout tx session.     Time in: 1025  Time out: [address] [id number]

## 2019-08-29 NOTE — PROGRESS NOTES
Tube Daily    atorvastatin  20 mg PEG Tube Nightly    aspirin  81 mg PEG Tube Daily    pantoprazole sodium  40 mg Per G Tube QAM AC    chlorhexidine  15 mL Mouth/Throat BID    insulin lispro  0-18 Units Subcutaneous Q6H    predniSONE  20 mg Oral Daily    sodium chloride (Inhalant)  4 mL Nebulization Daily    sodium chloride (PF)  10 mL Intravenous Daily    sucralfate  1 g PEG Tube 4 times per day    formoterol  20 mcg Nebulization BID    budesonide  250 mcg Nebulization BID    albuterol  2.5 mg Nebulization Q6H    sodium chloride flush  10 mL Intravenous 2 times per day    enoxaparin  30 mg Subcutaneous Daily       Physical Exam:  General Appearance: appears comfortable in no acute distress. HEENT: Normocephalic atraumatic without obvious abnormality   Neck: Lips, mucosa, and tongue normal.  Supple, symmetrical, trachea midline, no adenopathy;thyroid:  no enlargement/tenderness/nodules or JVD. Lung: Breath sounds diminished R>L. Slight wheeze. Respirations   unlabored. Symmetrical expansion. Heart: RRR, normal S1, S2. No MRG  Abdomen: Soft, NT, ND. BS present x 4 quadrants. No bruit or organomegaly. Extremities: Pedal pulses 2+ symmetric b/l. Extremities normal, no cyanosis, clubbing, or edema. Musculokeletal: No joint swelling, no muscle tenderness. ROM normal in all joints of extremities. Neurologic: Mental status: unable to move left. some aphasia and left lateral gaze     Pertinent/ New Labs and Imaging Studies     Imaging Personally Reviewed:  8/27/2019 chest x-ray   Order Date: 8/27/2019 6:00 AM       Exam: XR CHEST PORTABLE       Number of Views: 1       Indication:   Edema, aspiration pneumonia       Comparison: 8/26/2019       Findings: There is a stable, large cardiomediastinal silhouette with   bibasilar airspace disease, central pulmonary vascular congestion,   thoracic aortic vascular calcifications with bilateral pleural   effusions. . No pneumothorax.           Impression   1.

## 2019-08-30 ENCOUNTER — APPOINTMENT (OUTPATIENT)
Dept: GENERAL RADIOLOGY | Age: 84
DRG: 064 | End: 2019-08-30
Payer: MEDICARE

## 2019-08-30 VITALS
TEMPERATURE: 97.4 F | WEIGHT: 160 LBS | DIASTOLIC BLOOD PRESSURE: 75 MMHG | BODY MASS INDEX: 21.67 KG/M2 | RESPIRATION RATE: 22 BRPM | SYSTOLIC BLOOD PRESSURE: 145 MMHG | OXYGEN SATURATION: 99 % | HEART RATE: 90 BPM | HEIGHT: 72 IN

## 2019-08-30 LAB
ANION GAP SERPL CALCULATED.3IONS-SCNC: 11 MMOL/L (ref 7–16)
BUN BLDV-MCNC: 56 MG/DL (ref 8–23)
CALCIUM SERPL-MCNC: 8.2 MG/DL (ref 8.6–10.2)
CHLORIDE BLD-SCNC: 110 MMOL/L (ref 98–107)
CO2: 31 MMOL/L (ref 22–29)
CREAT SERPL-MCNC: 1.2 MG/DL (ref 0.7–1.2)
GFR AFRICAN AMERICAN: >60
GFR NON-AFRICAN AMERICAN: 57 ML/MIN/1.73
GLUCOSE BLD-MCNC: 132 MG/DL (ref 74–99)
METER GLUCOSE: 112 MG/DL (ref 74–99)
POTASSIUM SERPL-SCNC: 4.4 MMOL/L (ref 3.5–5)
SODIUM BLD-SCNC: 152 MMOL/L (ref 132–146)

## 2019-08-30 PROCEDURE — 94669 MECHANICAL CHEST WALL OSCILL: CPT

## 2019-08-30 PROCEDURE — 80048 BASIC METABOLIC PNL TOTAL CA: CPT

## 2019-08-30 PROCEDURE — 2700000000 HC OXYGEN THERAPY PER DAY

## 2019-08-30 PROCEDURE — 6360000002 HC RX W HCPCS: Performed by: STUDENT IN AN ORGANIZED HEALTH CARE EDUCATION/TRAINING PROGRAM

## 2019-08-30 PROCEDURE — 82962 GLUCOSE BLOOD TEST: CPT

## 2019-08-30 PROCEDURE — 94640 AIRWAY INHALATION TREATMENT: CPT

## 2019-08-30 PROCEDURE — 6370000000 HC RX 637 (ALT 250 FOR IP): Performed by: INTERNAL MEDICINE

## 2019-08-30 PROCEDURE — 99232 SBSQ HOSP IP/OBS MODERATE 35: CPT | Performed by: NURSE PRACTITIONER

## 2019-08-30 PROCEDURE — 6370000000 HC RX 637 (ALT 250 FOR IP): Performed by: NURSE PRACTITIONER

## 2019-08-30 PROCEDURE — 36415 COLL VENOUS BLD VENIPUNCTURE: CPT

## 2019-08-30 PROCEDURE — 2580000003 HC RX 258: Performed by: STUDENT IN AN ORGANIZED HEALTH CARE EDUCATION/TRAINING PROGRAM

## 2019-08-30 PROCEDURE — 2580000003 HC RX 258: Performed by: NURSE PRACTITIONER

## 2019-08-30 PROCEDURE — 6370000000 HC RX 637 (ALT 250 FOR IP): Performed by: STUDENT IN AN ORGANIZED HEALTH CARE EDUCATION/TRAINING PROGRAM

## 2019-08-30 PROCEDURE — 71045 X-RAY EXAM CHEST 1 VIEW: CPT

## 2019-08-30 RX ORDER — ATORVASTATIN CALCIUM 20 MG/1
20 TABLET, FILM COATED ORAL NIGHTLY
Qty: 30 TABLET | Refills: 3
Start: 2019-08-30

## 2019-08-30 RX ORDER — LEVOTHYROXINE SODIUM 0.07 MG/1
75 TABLET ORAL DAILY
Qty: 30 TABLET | Refills: 3
Start: 2019-08-31

## 2019-08-30 RX ORDER — PANTOPRAZOLE SODIUM 40 MG/1
40 GRANULE, DELAYED RELEASE ORAL
Qty: 30 EACH | Refills: 3
Start: 2019-08-31

## 2019-08-30 RX ORDER — ALBUTEROL SULFATE 2.5 MG/3ML
2.5 SOLUTION RESPIRATORY (INHALATION) EVERY 6 HOURS
Qty: 120 EACH | Refills: 3
Start: 2019-08-30

## 2019-08-30 RX ORDER — ASPIRIN 81 MG/1
81 TABLET, CHEWABLE ORAL DAILY
Qty: 30 TABLET | Refills: 3
Start: 2019-08-30

## 2019-08-30 RX ORDER — PREDNISONE 10 MG/1
10 TABLET ORAL DAILY
Status: DISCONTINUED | OUTPATIENT
Start: 2019-08-30 | End: 2019-08-30 | Stop reason: HOSPADM

## 2019-08-30 RX ORDER — INSULIN GLARGINE 100 [IU]/ML
15 INJECTION, SOLUTION SUBCUTANEOUS DAILY
Qty: 1 VIAL | Refills: 3
Start: 2019-08-31

## 2019-08-30 RX ORDER — BUDESONIDE 0.25 MG/2ML
250 INHALANT ORAL 2 TIMES DAILY
Qty: 60 AMPULE | Refills: 3
Start: 2019-08-30

## 2019-08-30 RX ORDER — FORMOTEROL FUMARATE 20 UG/2ML
20 SOLUTION RESPIRATORY (INHALATION) 2 TIMES DAILY
Qty: 120 ML | Refills: 0
Start: 2019-08-30

## 2019-08-30 RX ORDER — PREDNISONE 10 MG/1
10 TABLET ORAL DAILY
Qty: 3 TABLET | Refills: 0
Start: 2019-08-30 | End: 2019-09-02

## 2019-08-30 RX ADMIN — BUDESONIDE 250 MCG: 0.25 SUSPENSION RESPIRATORY (INHALATION) at 09:32

## 2019-08-30 RX ADMIN — ENOXAPARIN SODIUM 30 MG: 30 INJECTION SUBCUTANEOUS at 08:51

## 2019-08-30 RX ADMIN — SUCRALFATE 1 G: 1 TABLET ORAL at 06:27

## 2019-08-30 RX ADMIN — Medication 10 ML: at 08:55

## 2019-08-30 RX ADMIN — LEVOTHYROXINE SODIUM 75 MCG: 75 TABLET ORAL at 06:27

## 2019-08-30 RX ADMIN — ALBUTEROL SULFATE 2.5 MG: 2.5 SOLUTION RESPIRATORY (INHALATION) at 09:31

## 2019-08-30 RX ADMIN — PREDNISONE 10 MG: 20 TABLET ORAL at 08:51

## 2019-08-30 RX ADMIN — SODIUM CHLORIDE SOLN NEBU 3% 4 ML: 3 NEBU SOLN at 09:32

## 2019-08-30 RX ADMIN — PANTOPRAZOLE SODIUM 40 MG: 40 GRANULE, DELAYED RELEASE ORAL at 06:27

## 2019-08-30 RX ADMIN — FORMOTEROL FUMARATE DIHYDRATE 20 MCG: 20 SOLUTION RESPIRATORY (INHALATION) at 09:31

## 2019-08-30 RX ADMIN — ALBUTEROL SULFATE 2.5 MG: 2.5 SOLUTION RESPIRATORY (INHALATION) at 03:43

## 2019-08-30 RX ADMIN — SUCRALFATE 1 G: 1 TABLET ORAL at 01:03

## 2019-08-30 RX ADMIN — ASPIRIN 81 MG 81 MG: 81 TABLET ORAL at 08:51

## 2019-08-30 RX ADMIN — CHLORHEXIDINE GLUCONATE 0.12% ORAL RINSE 15 ML: 1.2 LIQUID ORAL at 08:51

## 2019-08-30 RX ADMIN — INSULIN GLARGINE 15 UNITS: 100 INJECTION, SOLUTION SUBCUTANEOUS at 06:20

## 2019-08-30 ASSESSMENT — PAIN SCALES - GENERAL: PAINLEVEL_OUTOF10: 0

## 2019-08-30 NOTE — PROGRESS NOTES
was not eligible for TPA. Xray showed white out right lung. He is requiring high flow n/c. Troponin was elevated. He is DNR CCA with no intubation. Subjective/Events/Discussions:  8/20/19 Alert, dysarthric. His son is at bedside. He has been with his father all night and reports he has been agitated and he was given med that helped. He did try to communicated some. He lives at home with his wife who he cares for and still drives. Family plans to see how he does over the next few day. 8/29/19  Pall med has been asked to see patient again to discuss goals of care. He had peg tube placed. He has been aspirating on oral secretions despite suction, chest vest and nebulizer. Per pulmonology this will be a recurrent problem. Patient is obtunded. No family at bedside. Spoke with RN. He did assist with turning and follows some commands. He only says no to a few questions and otherwise nonverbal.     8/31/19  Remains obtunded. He is for discharge to [facility] today. His son Rhianna Erickson is at bedside. We discussed concern for recurrent aspiration leading to recurrent resp failure and over all poor quality of life. I discussed that hospice would be appropriate. He would like for me to meet with his brothers if possible before discharge to discuss above. He is aware that hospice can be consulted in nursing facility. ROS: unable to obtain   UNLESS STATED ABOVE PATIENT DENIES:  CONSTITUTIONAL:  fever, chill, rigors, nausea, vomiting, fatigue. HEENT: blurry vision, double vision, hearing problem, tinnitus, hoarseness, dysphagia, odynophagia  RESPIRATORY: cough, shortness of breath, sputum expectoration. CARDIOVASCULAR:  Chest pain/pressure, palpitation, syncope, irregular beats  GASTROINTESTINAL:  abdominal or rectal pain, diarrhea, constipation, .   GENITOURINARY:  Burning, frequency, urgency, incontinence,   INTEGUMENTARY: rash, wound, pruritis  HEMATOLOGIC/LYMPHATIC:  Swelling, sores, easy bruising,   MUSCULOSKELETAL:  pain, edema, joint swelling or redness  NEUROLOGICAL:  light headed, dizziness, weakness, tremors    Objective:     Physical Exam  BP (!) 145/75   Pulse 90   Temp 97.4 °F (36.3 °C) (Temporal)   Resp 22   Ht 6' (1.829 m)   Wt 160 lb (72.6 kg) Comment: bedscale per RD  SpO2 99%   BMI 21.70 kg/m²     Gen:  appears stated age, well nourished, in no acute distress  HEENT:  Normocephalic, conjunctiva pink, no drainage, mucosa moist  Neck:  Supple  Lungs:  VTA bilaterally, no audible crackles or wheezes noted  Heart[de-identified]  RRR, no murmur, rub, or gallop noted during exam  Abd:  Soft, non tender, non distended, BS+  Ext:   no edema, pulses present  Skin:  Warm and dry  Neuro:  Obtunded, not following commands      Current Medications:  Inpatient/Home medications reviewed:    Results/Verification of Data Review  Objective data reviewed: labs, images, records, medication use, vitals and chart      - Advanced Directives: unknown   -Surrogate/Legal NOK: Spouse    Contacts:  Ethel Karimi [phone]    - Spiritual assessment: No spiritual distress identified     - Bereavement and grief: to be determined    - Discharge planning: to be determined    - Prognosis: Guarded    - Referrals to: none today    Time/Communication  Greater than 51% of time spent, total 25 minutes in counseling and coordination of care at the bedside regarding goals of care, diagnosis and prognosis and see above. Yina THORNTON-MIGEL  Palliative Medicine    Discussed patient and the plan of care with the other IDT members of Palliative Med team and with family and floor nurse. Thank you for allowing Palliative Medicine to participate in the care of [facility]. Note: This report was completed using [employer] voiced recognition software. Every effort has been made to ensure accuracy; however, inadvertent computerized transcription errors may be present.

## 2019-08-30 NOTE — PLAN OF CARE
Problem: Falls - Risk of:  Goal: Will remain free from falls  Description  Will remain free from falls  Outcome: Met This Shift  Goal: Absence of physical injury  Description  Absence of physical injury  Outcome: Met This Shift     Problem: Risk for Impaired Skin Integrity  Goal: Tissue integrity - skin and mucous membranes  Description  Structural intactness and normal physiological function of skin and  mucous membranes.   Outcome: Met This Shift     Problem: HEMODYNAMIC STATUS  Goal: Patient has stable vital signs and fluid balance  Outcome: Met This Shift     Problem: Nutrition  Goal: Optimal nutrition therapy  Outcome: Met This Shift

## 2019-08-30 NOTE — PROGRESS NOTES
[facility] Medicine    Subjective:  Pt seen this am pt lethargic      Current Facility-Administered Medications:     predniSONE (DELTASONE) tablet 10 mg, 10 mg, Oral, Daily, Damari Love Malmer, DO, 10 mg at 08/30/19 4876    magnesium hydroxide (MILK OF MAGNESIA) 400 MG/5ML suspension 30 mL, 30 mL, Per G Tube, Daily PRN, Damari Love Malmer, DO    insulin glargine (LANTUS) injection vial 15 Units, 15 Units, Subcutaneous, Daily, Damari Love Malmer, DO, 15 Units at 08/30/19 0620    levothyroxine (SYNTHROID) tablet 75 mcg, 75 mcg, PEG Tube, Daily, Damari Love Malmer, DO, 75 mcg at 08/30/19 0627    atorvastatin (LIPITOR) tablet 20 mg, 20 mg, PEG Tube, Nightly, Damari Finemer, DO, 20 mg at 08/29/19 2248    aspirin chewable tablet 81 mg, 81 mg, PEG Tube, Daily, Damari Finemer, DO, 81 mg at 08/30/19 0851    pantoprazole sodium (PROTONIX) packet 40 mg, 40 mg, Per G Tube, QAM AC, Damari Love Malmer, DO, 40 mg at 08/30/19 0627    chlorhexidine (PERIDEX) 0.12 % solution 15 mL, 15 mL, Mouth/Throat, BID, NORBERTO Leonardo - CNP, 15 mL at 08/30/19 0851    insulin lispro (HUMALOG) injection vial 0-18 Units, 0-18 Units, Subcutaneous, Q6H, Osmin Gonzalezovec, DO, 6 Units at 08/29/19 2252    sodium chloride (Inhalant) 3 % nebulizer solution 4 mL, 4 mL, Nebulization, Daily, Jonathan Ken APRN - CNP, 4 mL at 08/30/19 0932    glucose (GLUTOSE) 40 % oral gel 15 g, 15 g, Oral, PRN, Osmin PATTERSON Popovec, DO    dextrose 50 % IV solution, 12.5 g, Intravenous, PRN, Osmin PATTERSON Popovec, DO    glucagon (rDNA) injection 1 mg, 1 mg, Intramuscular, PRN, Osmin PATTERSON Popovec, DO    dextrose 5 % solution, 100 mL/hr, Intravenous, PRN, Osmin Lockwood,     [DISCONTINUED] pantoprazole (PROTONIX) injection 40 mg, 40 mg, Intravenous, QAM AC, 40 mg at 08/28/19 0608 **AND** sodium chloride (PF) 0.9 % injection 10 mL, 10 mL, Intravenous, Daily, Raina Vann MD, 10 mL at 08/29/19 0815    sucralfate (CARAFATE) tablet 1 g, 1 g, PEG Tube, 4 times per day,

## 2019-08-31 NOTE — DISCHARGE SUMMARY
[address]                  [address] [facility],  [facility]                               DISCHARGE SUMMARY    PATIENT NAME: Phill Bains                        :        1927  MED REC NO:   [id number]                            ROOM:       [id number]  ACCOUNT NO:   [de-identified]                           ADMIT DATE: 2019  PROVIDER:     Mateusz Killian DO                  DISCHARGE DATE:  2019    DISCHARGE DIAGNOSES:  1. Acute CVA. 2.  Acute respiratory failure with hypoxia. 3.  Aspiration pneumonitis  4. Hypothyroidism. 5.  Hypertension. 6.  Diabetes mellitus, on insulin. 7.  Hyperlipidemia. 8.  Coronary artery disease. 9.  Cerebrovascular disease. 10.  Oropharyngeal dysphagia. HOSPITAL COURSE:  The patient is a 41-year-old  male, presented  to the emergency room after being found on the floor at home. Diagnostic evaluation revealed acute CVA, acute respiratory failure with  hypoxia. The patient was admitted to the hospital.  He was seen by  Neurology, Palliative Care, General Surgery, Pulmonary. He had swallow  dysfunction. He underwent PEG tube placement. He was made a DNR-CCA  per family wishes, no aggressive intervention. The patient was  discharged to skilled nursing facility on 2019 in poor condition. DISCHARGE MEDICATIONS:  As per discharge med rec, which include:  1. Albuterol nebulizer. 2.  Aspirin. 3.  Atorvastatin. 4.  Budesonide nebulizer. 5.  Perforomist nebulizer. 6.  Lantus. 7.  Milk of Magnesia p.r.n.  8.  Protonix. 9.  Prednisone. 10.  Levothyroxine.         Ginette Song DO    D: 2019 12:57:30       T: 2019 13:08:22     [email or web address]  Job#: [id number]     Doc#: [id number]    CC:  Shona Robins DO

## (undated) DEVICE — GAUZE,SPONGE,POST-OP,4X3,STRL,LF: Brand: MEDLINE

## (undated) DEVICE — CANNULA NSL ORAL AD FOR CAPNOFLEX CO2 O2 AIRLFE

## (undated) DEVICE — BLOCK BITE 60FR RUBBER ADLT DENTAL

## (undated) DEVICE — DEFENDO AIR WATER SUCTION AND BIOPSY VALVE KIT FOR  OLYMPUS: Brand: DEFENDO AIR/WATER/SUCTION AND BIOPSY VALVE

## (undated) DEVICE — BINDER ABD M/L H12IN FOR 46-62IN WHT 4 SLD PNL DSGN HOOP

## (undated) DEVICE — Device